# Patient Record
Sex: MALE | Race: WHITE | Employment: FULL TIME | URBAN - METROPOLITAN AREA
[De-identification: names, ages, dates, MRNs, and addresses within clinical notes are randomized per-mention and may not be internally consistent; named-entity substitution may affect disease eponyms.]

---

## 2024-03-01 ENCOUNTER — PREP FOR PROCEDURE (OUTPATIENT)
Dept: PULMONOLOGY | Age: 74
End: 2024-03-01

## 2024-03-01 RX ORDER — AMLODIPINE BESYLATE 5 MG/1
5 TABLET ORAL DAILY
COMMUNITY

## 2024-03-01 NOTE — PROGRESS NOTES
Patient reached _X___ yes--spoke with daughter, Beckie, who speaks English.      Date __3/7/24_______  Time __1100_____  Arrival __0930  hosp-endo____    Nothing to eat or drink after midnight-follow your doctors prep instructions-this may include taking a second dose of your prep after midnight  Responsible adult 18 or older to stay on site while you are here-drive you home-stay with you after  Follow any instructions your doctors office has given you  Bring a complete list of all your medications and supplements including name,dose,how often taken the day of your procedure  If you normally take the following medications in the morning please do so the AM of your procedure with a small sip of water       Heart,blood pressure,seizure,thyroid or breathing medications-use your inhalers-bring any rescue inhalers with you DOS       DO NOT take blood pressure medications ending in \"kathya\" or \"pril\" the AM of procedure or evening prior  Dr Sood patients are not to take any medications the AM of surgery  Take half or your normal dose of any long acting insulins the night before your procedure-do not take any diabetic medications the AM of procedure  Follow your doctors instructions regarding stopping or taking  any blood thinners-if you do not have instructions-call them  Any questions call your doctor  Other ___take amlodipine am of procedure___________________________________________________________      VISITOR POLICY(subject to change)             The current policy is 2 visitors per patient.There are no children allowed.Mask at discretion of facility. Visiting hours are 8a-8p.Overnight visitors will be at the discretion of the nurse. All policies are subject to change.

## 2024-03-04 ENCOUNTER — HOSPITAL ENCOUNTER (OUTPATIENT)
Age: 74
Discharge: HOME | End: 2024-03-04
Attending: INTERNAL MEDICINE
Payer: MEDICARE

## 2024-03-04 DIAGNOSIS — C34.12 SQUAMOUS CELL CARCINOMA OF BRONCHUS IN LEFT UPPER LOBE (HCC): ICD-10-CM

## 2024-03-04 PROCEDURE — A9579 GAD-BASE MR CONTRAST NOS,1ML: HCPCS | Performed by: INTERNAL MEDICINE

## 2024-03-04 PROCEDURE — 70553 MRI BRAIN STEM W/O & W/DYE: CPT

## 2024-03-04 PROCEDURE — 6360000004 HC RX CONTRAST MEDICATION: Performed by: INTERNAL MEDICINE

## 2024-03-04 RX ADMIN — GADOTERIDOL 16 ML: 279.3 INJECTION, SOLUTION INTRAVENOUS at 08:12

## 2024-03-06 ENCOUNTER — ANESTHESIA EVENT (OUTPATIENT)
Dept: ENDOSCOPY | Age: 74
End: 2024-03-06
Payer: COMMERCIAL

## 2024-03-07 ENCOUNTER — APPOINTMENT (OUTPATIENT)
Dept: GENERAL RADIOLOGY | Age: 74
End: 2024-03-07
Attending: INTERNAL MEDICINE
Payer: COMMERCIAL

## 2024-03-07 ENCOUNTER — HOSPITAL ENCOUNTER (OUTPATIENT)
Dept: CT IMAGING | Age: 74
Discharge: HOME | End: 2024-03-07
Attending: INTERNAL MEDICINE
Payer: COMMERCIAL

## 2024-03-07 ENCOUNTER — ANESTHESIA (OUTPATIENT)
Dept: ENDOSCOPY | Age: 74
End: 2024-03-07
Payer: COMMERCIAL

## 2024-03-07 ENCOUNTER — HOSPITAL ENCOUNTER (OUTPATIENT)
Age: 74
Setting detail: OUTPATIENT SURGERY
Discharge: HOME | End: 2024-03-07
Attending: INTERNAL MEDICINE | Admitting: INTERNAL MEDICINE
Payer: COMMERCIAL

## 2024-03-07 VITALS
RESPIRATION RATE: 16 BRPM | SYSTOLIC BLOOD PRESSURE: 123 MMHG | TEMPERATURE: 97.8 F | BODY MASS INDEX: 26.52 KG/M2 | HEART RATE: 81 BPM | OXYGEN SATURATION: 93 % | DIASTOLIC BLOOD PRESSURE: 68 MMHG | HEIGHT: 68 IN | WEIGHT: 175 LBS

## 2024-03-07 DIAGNOSIS — R91.1 LUNG NODULE: ICD-10-CM

## 2024-03-07 LAB
APTT BLD: 30.5 SEC (ref 22.7–35.9)
DEPRECATED RDW RBC AUTO: 13.7 % (ref 12.4–15.4)
HCT VFR BLD AUTO: 42.7 % (ref 40.5–52.5)
HGB BLD-MCNC: 14.3 G/DL (ref 13.5–17.5)
INR PPP: 1.08 (ref 0.84–1.16)
MCH RBC QN AUTO: 29.9 PG (ref 26–34)
MCHC RBC AUTO-ENTMCNC: 33.6 G/DL (ref 31–36)
MCV RBC AUTO: 89.1 FL (ref 80–100)
PLATELET # BLD AUTO: 283 K/UL (ref 135–450)
PMV BLD AUTO: 7.5 FL (ref 5–10.5)
PROTHROMBIN TIME: 14 SEC (ref 11.5–14.8)
RBC # BLD AUTO: 4.79 M/UL (ref 4.2–5.9)
WBC # BLD AUTO: 8.5 K/UL (ref 4–11)

## 2024-03-07 PROCEDURE — 3700000000 HC ANESTHESIA ATTENDED CARE: Performed by: INTERNAL MEDICINE

## 2024-03-07 PROCEDURE — 88112 CYTOPATH CELL ENHANCE TECH: CPT

## 2024-03-07 PROCEDURE — 88333 PATH CONSLTJ SURG CYTO XM 1: CPT

## 2024-03-07 PROCEDURE — 71045 X-RAY EXAM CHEST 1 VIEW: CPT

## 2024-03-07 PROCEDURE — 2720000010 HC SURG SUPPLY STERILE: Performed by: INTERNAL MEDICINE

## 2024-03-07 PROCEDURE — 88104 CYTOPATH FL NONGYN SMEARS: CPT

## 2024-03-07 PROCEDURE — 3609020000 HC BRONCHOSCOPY W/EBUS FNA: Performed by: INTERNAL MEDICINE

## 2024-03-07 PROCEDURE — 88341 IMHCHEM/IMCYTCHM EA ADD ANTB: CPT

## 2024-03-07 PROCEDURE — 7100000000 HC PACU RECOVERY - FIRST 15 MIN: Performed by: INTERNAL MEDICINE

## 2024-03-07 PROCEDURE — 31629 BRONCHOSCOPY/NEEDLE BX EACH: CPT | Performed by: INTERNAL MEDICINE

## 2024-03-07 PROCEDURE — 31623 DX BRONCHOSCOPE/BRUSH: CPT | Performed by: INTERNAL MEDICINE

## 2024-03-07 PROCEDURE — 6360000002 HC RX W HCPCS: Performed by: NURSE ANESTHETIST, CERTIFIED REGISTERED

## 2024-03-07 PROCEDURE — 71250 CT THORAX DX C-: CPT

## 2024-03-07 PROCEDURE — 3700000001 HC ADD 15 MINUTES (ANESTHESIA): Performed by: INTERNAL MEDICINE

## 2024-03-07 PROCEDURE — 36415 COLL VENOUS BLD VENIPUNCTURE: CPT

## 2024-03-07 PROCEDURE — 2580000003 HC RX 258: Performed by: ANESTHESIOLOGY

## 2024-03-07 PROCEDURE — 88305 TISSUE EXAM BY PATHOLOGIST: CPT

## 2024-03-07 PROCEDURE — 7100000011 HC PHASE II RECOVERY - ADDTL 15 MIN: Performed by: INTERNAL MEDICINE

## 2024-03-07 PROCEDURE — 2500000003 HC RX 250 WO HCPCS: Performed by: NURSE ANESTHETIST, CERTIFIED REGISTERED

## 2024-03-07 PROCEDURE — C1725 CATH, TRANSLUMIN NON-LASER: HCPCS | Performed by: INTERNAL MEDICINE

## 2024-03-07 PROCEDURE — 3603165200 HC BRNCHSC EBUS GUIDED SAMPL 1/2 NODE STATION/STRUX: Performed by: INTERNAL MEDICINE

## 2024-03-07 PROCEDURE — 31627 NAVIGATIONAL BRONCHOSCOPY: CPT | Performed by: INTERNAL MEDICINE

## 2024-03-07 PROCEDURE — 88172 CYTP DX EVAL FNA 1ST EA SITE: CPT

## 2024-03-07 PROCEDURE — 88360 TUMOR IMMUNOHISTOCHEM/MANUAL: CPT

## 2024-03-07 PROCEDURE — 88173 CYTOPATH EVAL FNA REPORT: CPT

## 2024-03-07 PROCEDURE — 88177 CYTP FNA EVAL EA ADDL: CPT

## 2024-03-07 PROCEDURE — 85610 PROTHROMBIN TIME: CPT

## 2024-03-07 PROCEDURE — 3609011800 HC BRONCHOSCOPY/TRANSBRONCHIAL LUNG BIOPSY: Performed by: INTERNAL MEDICINE

## 2024-03-07 PROCEDURE — 88342 IMHCHEM/IMCYTCHM 1ST ANTB: CPT

## 2024-03-07 PROCEDURE — 31652 BRONCH EBUS SAMPLNG 1/2 NODE: CPT | Performed by: INTERNAL MEDICINE

## 2024-03-07 PROCEDURE — 85027 COMPLETE CBC AUTOMATED: CPT

## 2024-03-07 PROCEDURE — 2709999900 HC NON-CHARGEABLE SUPPLY: Performed by: INTERNAL MEDICINE

## 2024-03-07 PROCEDURE — 85730 THROMBOPLASTIN TIME PARTIAL: CPT

## 2024-03-07 PROCEDURE — 31624 DX BRONCHOSCOPE/LAVAGE: CPT | Performed by: INTERNAL MEDICINE

## 2024-03-07 PROCEDURE — 3609011900 HC BRONCHOSCOPY NEEDLE BX TRACHEA MAIN STEM&/BRON: Performed by: INTERNAL MEDICINE

## 2024-03-07 PROCEDURE — 88334 PATH CONSLTJ SURG CYTO XM EA: CPT

## 2024-03-07 PROCEDURE — 7100000010 HC PHASE II RECOVERY - FIRST 15 MIN: Performed by: INTERNAL MEDICINE

## 2024-03-07 PROCEDURE — 7100000001 HC PACU RECOVERY - ADDTL 15 MIN: Performed by: INTERNAL MEDICINE

## 2024-03-07 PROCEDURE — 31628 BRONCHOSCOPY/LUNG BX EACH: CPT | Performed by: INTERNAL MEDICINE

## 2024-03-07 RX ORDER — SODIUM CHLORIDE 9 MG/ML
INJECTION, SOLUTION INTRAVENOUS CONTINUOUS
Status: DISCONTINUED | OUTPATIENT
Start: 2024-03-07 | End: 2024-03-07 | Stop reason: HOSPADM

## 2024-03-07 RX ORDER — LIDOCAINE HYDROCHLORIDE 20 MG/ML
INJECTION, SOLUTION EPIDURAL; INFILTRATION; INTRACAUDAL; PERINEURAL PRN
Status: DISCONTINUED | OUTPATIENT
Start: 2024-03-07 | End: 2024-03-07 | Stop reason: SDUPTHER

## 2024-03-07 RX ORDER — ONDANSETRON 2 MG/ML
INJECTION INTRAMUSCULAR; INTRAVENOUS PRN
Status: DISCONTINUED | OUTPATIENT
Start: 2024-03-07 | End: 2024-03-07 | Stop reason: SDUPTHER

## 2024-03-07 RX ORDER — ROCURONIUM BROMIDE 10 MG/ML
INJECTION, SOLUTION INTRAVENOUS PRN
Status: DISCONTINUED | OUTPATIENT
Start: 2024-03-07 | End: 2024-03-07 | Stop reason: SDUPTHER

## 2024-03-07 RX ORDER — PROPOFOL 10 MG/ML
INJECTION, EMULSION INTRAVENOUS PRN
Status: DISCONTINUED | OUTPATIENT
Start: 2024-03-07 | End: 2024-03-07 | Stop reason: SDUPTHER

## 2024-03-07 RX ORDER — FENTANYL CITRATE 50 UG/ML
INJECTION, SOLUTION INTRAMUSCULAR; INTRAVENOUS PRN
Status: DISCONTINUED | OUTPATIENT
Start: 2024-03-07 | End: 2024-03-07 | Stop reason: SDUPTHER

## 2024-03-07 RX ORDER — DEXAMETHASONE SODIUM PHOSPHATE 4 MG/ML
INJECTION, SOLUTION INTRA-ARTICULAR; INTRALESIONAL; INTRAMUSCULAR; INTRAVENOUS; SOFT TISSUE PRN
Status: DISCONTINUED | OUTPATIENT
Start: 2024-03-07 | End: 2024-03-07 | Stop reason: SDUPTHER

## 2024-03-07 RX ADMIN — PHENYLEPHRINE HYDROCHLORIDE 100 MCG: 10 INJECTION INTRAVENOUS at 11:41

## 2024-03-07 RX ADMIN — PROPOFOL 150 MG: 10 INJECTION, EMULSION INTRAVENOUS at 11:24

## 2024-03-07 RX ADMIN — ROCURONIUM BROMIDE 50 MG: 10 INJECTION, SOLUTION INTRAVENOUS at 11:24

## 2024-03-07 RX ADMIN — SUGAMMADEX 200 MG: 100 INJECTION, SOLUTION INTRAVENOUS at 12:59

## 2024-03-07 RX ADMIN — ONDANSETRON 4 MG: 2 INJECTION INTRAMUSCULAR; INTRAVENOUS at 11:28

## 2024-03-07 RX ADMIN — SODIUM CHLORIDE: 9 INJECTION, SOLUTION INTRAVENOUS at 09:34

## 2024-03-07 RX ADMIN — SODIUM CHLORIDE: 9 INJECTION, SOLUTION INTRAVENOUS at 12:32

## 2024-03-07 RX ADMIN — FENTANYL CITRATE 50 MCG: 50 INJECTION, SOLUTION INTRAMUSCULAR; INTRAVENOUS at 11:24

## 2024-03-07 RX ADMIN — DEXAMETHASONE SODIUM PHOSPHATE 8 MG: 4 INJECTION, SOLUTION INTRAMUSCULAR; INTRAVENOUS at 11:28

## 2024-03-07 RX ADMIN — LIDOCAINE HYDROCHLORIDE 100 MG: 20 INJECTION, SOLUTION EPIDURAL; INFILTRATION; INTRACAUDAL; PERINEURAL at 11:24

## 2024-03-07 ASSESSMENT — PAIN - FUNCTIONAL ASSESSMENT
PAIN_FUNCTIONAL_ASSESSMENT: NONE - DENIES PAIN
PAIN_FUNCTIONAL_ASSESSMENT: 0-10
PAIN_FUNCTIONAL_ASSESSMENT: NONE - DENIES PAIN

## 2024-03-07 ASSESSMENT — ENCOUNTER SYMPTOMS: SHORTNESS OF BREATH: 0

## 2024-03-07 NOTE — ANESTHESIA POSTPROCEDURE EVALUATION
Department of Anesthesiology  Postprocedure Note    Patient: Drew Carrasco  MRN: [id number]  YOB: 1950  Date of evaluation: 3/7/2024    Procedure Summary       Date: 03/07/24 Room / Location: [place] [address] / [facility]    Anesthesia Start: 1117 Anesthesia Stop: 1309    Procedures:       BRONCHOSCOPY/TRANSBRONCHIAL NEEDLE BIOPSY ROBOTIC      BRONCHOSCOPY ENDOBRONCHIAL ULTRASOUND      BRONCHOSCOPY/TRANSBRONCHIAL LUNG BIOPSY ROBOTIC      BRONCHOSCOPY/TRANSBRONCHIAL NEEDLE BIOPSY (Abdomen)      BRONCHOSCOPY ENDOBRONCHIAL ULTRASOUND FINE NEEDLE ASPIRATION Diagnosis:       Lung nodule      (Lung nodule [R91.1])    Surgeons: Uma Bautista MD Responsible Provider: Tex Robles MD    Anesthesia Type: MAC ASA Status: 2            Anesthesia Type: No value filed.    Hernan Phase I: Hernan Score: 10    Hernan Phase II:      Anesthesia Post Evaluation    Patient location during evaluation: PACU  Patient participation: complete - patient participated  Level of consciousness: awake  Airway patency: patent  Nausea & Vomiting: no nausea and no vomiting  Cardiovascular status: hemodynamically stable  Respiratory status: acceptable  Hydration status: stable  Multimodal analgesia pain management approach  Pain management: adequate    No notable events documented.

## 2024-03-07 NOTE — PROGRESS NOTES
IMPRESSION:  No evidence of pneumothorax in this patient status post [place] guided biopsy  of left lung mass as described above.

## 2024-03-07 NOTE — PROGRESS NOTES
Teaching/ education completed for home care including pain management, activity,safety precautionsand infection control. Patient and daughter verbalized understanding.

## 2024-03-07 NOTE — PROGRESS NOTES
To PACU via cart from endoscopy dept.  Report given to  myself from endoscopy dept, then given to Nelly PACU to complete hand-off procedure.

## 2024-03-07 NOTE — ANESTHESIA PRE PROCEDURE
Department of Anesthesiology  Preprocedure Note       Name:  Drew Carrasco   Age:  74 y.o.  :  1950                                          MRN:  [id number]         Date:  3/7/2024      Surgeon: Surgeon(s):  Uma Bautista MD    Procedure: Procedure(s):  BRONCHOSCOPY ROBOTIC  BRONCHOSCOPY ENDOBRONCHIAL ULTRASOUND    Medications prior to admission:   Prior to Admission medications    Medication Sig Start Date End Date Taking? Authorizing Provider   Omeprazole Magnesium (PRILOSEC PO) Take 20 mg by mouth once a week   Yes ProviderLolly MD   amLODIPine (NORVASC) 5 MG tablet Take 1 tablet by mouth daily   Yes Provider, MD Lolly       Current medications:    Current Facility-Administered Medications   Medication Dose Route Frequency Provider Last Rate Last Admin    0.9 % sodium chloride infusion   IntraVENous Continuous Tangela Ayala  mL/hr at 24 0934 New Bag at 24 0934       Allergies:  No Known Allergies    Problem List:  There is no problem list on file for this patient.      Past Medical History:        Diagnosis Date    Hypertension     Mass of upper lobe of lung     left lung       Past Surgical History:        Procedure Laterality Date    BOWEL RESECTION      due to severe ulcers       Social History:    Social History     Tobacco Use    Smoking status: Not on file    Smokeless tobacco: Not on file   Substance Use Topics    Alcohol use: Not on file                                Counseling given: Not Answered      Vital Signs (Current):   Vitals:    24 1535 24 0932   BP:  (!) 144/86   Pulse:  92   Resp:  20   Temp:  98.3 °F (36.8 °C)   TempSrc:  Temporal   SpO2:  93%   Weight: 79.4 kg (175 lb)    Height: 1.727 m (5' 8\")                                               BP Readings from Last 3 Encounters:   24 (!) 144/86       NPO Status: Time of last liquid consumption: 1630                        Time of last solid consumption: 1630

## 2024-03-07 NOTE — PROGRESS NOTES
Discharge instructions reviewed with patient/responsible adult. All home medications have been reviewed, questions answered and patient and daughter verbalized understanding.  Discharge instructions signed and copies given. Patient discharged per w/c with belongings.

## 2024-03-07 NOTE — H&P
PULMONARY AND CRITICAL CARE CONSULTATION NOTE    CONSULTING PHYSICIAN:      REASON FOR CONSULT: No chief complaint on file.      DATE OF CONSULT: 3/7/2024    HISTORY OF PRESENT ILLNESS: 74 y.o. year old male with history of hypertension and left upper lobe mass.    REVIEW OF SYSTEMS:   Not obtained    PAST MEDICAL HISTORY:   Past Medical History:   Diagnosis Date    Hypertension     Mass of upper lobe of lung     left lung       PAST SURGICAL HISTORY:   Past Surgical History:   Procedure Laterality Date    BOWEL RESECTION      due to severe ulcers       SOCIAL HISTORY:      FAMILY HISTORY: History reviewed. No pertinent family history.    MEDICATIONS:     No current facility-administered medications on file prior to encounter.     Current Outpatient Medications on File Prior to Encounter   Medication Sig Dispense Refill    Omeprazole Magnesium (PRILOSEC PO) Take 20 mg by mouth once a week      amLODIPine (NORVASC) 5 MG tablet Take 1 tablet by mouth daily            sodium chloride Stopped (03/07/24 1303)         ALLERGIES:   Allergies as of 03/01/2024    (No Known Allergies)      OBJECTIVE:   height is 1.727 m (5' 8\") and weight is 79.4 kg (175 lb). His temporal temperature is 97.4 °F (36.3 °C). His blood pressure is 110/65 and his pulse is 88. His respiration is 18 and oxygen saturation is 95%.   I/O this shift:  In: 1100 [I.V.:1100]  Out: -      PHYSICAL EXAM:  CONSTITUTIONAL: He is a 74 y.o.-year-old who appears his stated age. He is alert and oriented x 3 and in no acute distress.   HEENT: PERRLA No scleral icterus. No thrush, atraumatic, normocephalic.  NECK: Supple, without cervical or supraclavicular lymphadenopathy:  CARDIOVASCULAR: S1 S2 RRR. Without murmer  RESPIRATORY & CHEST: Lungs are clear to auscultation and percussion. No wheezing, no crackles. Good air movement  GASTROINTESTINAL & ABDOMEN: Soft, nontender, positive bowel sounds in all quadrants, non-distended, without hepatosplenomegaly.    GENITOURINARY: Deferred.   MUSCULOSKELETAL: No tenderness to palpation of the axial skeleton. There is no clubbing. No cyanosis. No edema of the lower extremities.   SKIN OF BODY: No rash or jaundice.   PSYCHIATRIC EVALUATION: Normal affect. Patient answers questions appropriately.   HEMATOLOGIC/LYMPHATIC/ IMMUNOLOGIC: No palpable lymphadenopathy.  NEUROLOGIC: Alert and oriented x 3.Groslly non-focal. Motor strength is 5+/5 in all muscle groups. The patient has a normal sensorium globally.      LABS:  Lab Results   Component Value Date    WBC 8.5 03/07/2024    HGB 14.3 03/07/2024    HCT 42.7 03/07/2024     03/07/2024    INR 1.08 03/07/2024       IMPRESSION:   Left upper lobe mass  Right hilar lymphadenopathy    RECOMMENDATION:   Patient will undergo robotic assisted transbronchial biopsy of left upper lobe mass and EBUS FNA of right hilar lymph node      Uma Bautista MD  Pulmonary Critical Care and Sleep Medicine  3/7/2024, 1:27 PM    This note was completed using dragon medical speech recognition software. Grammatical errors, random word insertions, pronoun errors and incomplete sentences are occasional consequences of this technology due to software limitations. If there are questions or concerns about the content of this note of information contained within the body of this dictation they should be addressed with the provider for clarification.

## 2024-03-07 NOTE — PROCEDURES
Bronchoscopy procedure note    Indications for procedure: Left upper lobe mass and right hilar lymphadenopathy  Preprocedure diagnosis: Same  Postprocedure diagnosis: Same  Anesthesia: General  ASA: 3  Mallampati Score: 3    Procedure:   1.  Robotic assisted bronchoscopy  2.  Transbronchial lung biopsy  3.  Transbronchial needle aspiration  4.  Transbronchial brushing  5.  EBUS FNA  6.  Bronchoalveolar lavage    Complications: None were apparent.  Blood loss: <10 mL    Description of procedure: After obtaining informed consent from the patient  was set up for this procedure as an inpatient.  After induction with general anesthesia patient was intubated with a 8.0 endotracheal tube.  Bronchoscope was inserted through this endotracheal tube.      EBUS bronchsocope was switched on and various lymph node stations were visualized. Significant lymphadenopathy was seen: Yes at stations right hilar. The right hilar lymph node stations were sampled with Trans bronchial needle aspiration using EBUS guidance. Mild oozing of blood was seen which was controlled with instillation of ice cold saline.    Bronchoscope was used to register the tracheobronchial landmarks with the Spool robotic bronchoscopy  Software. Software guidance in the electromagnetic field was then used to advance the robotic bronchoscope and sheath into the left upper lobe segment. Once the bronchoscope was positioned closed to the lesion, its location was confirmed using fluoroscopy.      Bronchoscope was advanced into the left upper lobe. Transbronchial biopsy, transbronchial brushing, transbronchial needle aspiration and bronchoalveolar lavage was done.  Fluoroscopic guidance was used to perform the above procedures.      Once adequate sample was collected, bronchoscope was used to perform a thorough pulmonary toilet.  Subsequent to this anesthesia was reversed and patient was sent to recovery.  A postprocedure chest x-ray was ordered which is currently

## 2024-03-07 NOTE — PROGRESS NOTES
Phase 2 - awake, nasal cannula @ 93% room air ,denies shortness of breath or chest pain ,family at bedside,vss.

## 2024-03-07 NOTE — DISCHARGE INSTRUCTIONS
ENDOSCOPY DISCHARGE INSTRUCTIONS    You may experience some lightheadedness for the next several hours.  Plan on quiet relaxation for the rest of today.  A responsible adult needs to stay with you today.  Because of the medications you received today-do not drive,operate machinery,or sign any contractual agreement for the next 24 hours.  Do not drink any alcoholic beverages or take any unprescribed medications tonight.  Eat bland food and avoid anything greasy or spicy initially-progress to your normal diet gradually.  Diet restrictions as instructed.  You may resume home medications as instructed.  If you have any of the following problems, notify your physician or return to the hospital emergency room : fever, chills, excessive bleeding, excessive vomiting, difficulty swallowing, uncontrolled pain, increased abdominal distention, shortness of breath or any other problems.  If you had a polyp removed, avoid strenuous activity for 48 hours.Avoid the use of aspirin or related compounds for one week, unless otherwise instructed by your physician.  You may notice a small amount of blood in your next few bowel movements, but if a large amount passes, call your physician.  If you have a sore throat, you may use lozenges or salt water gargles.  If you had a bronchoscopy and you experience sudden or continued shortness of breath, chest pain, spitting up or vomiting blood, notify your physician or return to the hospital emergency room.     ANESTHESIA DISCHARGE INSTRUCTIONS    Wear your seatbelt home.  You are under the influence of drugs-do not drink alcohol, drive, operate machinery, make any important decisions or sign any legal documents for 24 hours.  A responsible adult needs to be with you for 24 hours.  You may experience lightheadedness, dizziness, or sleepiness following surgery.  Rest at home today- increase activity as tolerated.  Progress slowly to a regular diet unless your physician has instructed you

## 2024-05-22 ENCOUNTER — HOSPITAL ENCOUNTER (OUTPATIENT)
Age: 74
Discharge: HOME | End: 2024-05-22

## 2024-05-22 DIAGNOSIS — I49.40 CARDIAC ARRHYTHMIA DUE TO PREMATURE DEPOLARIZATION, UNSPECIFIED TYPE: ICD-10-CM

## 2024-07-15 ENCOUNTER — HOSPITAL ENCOUNTER (OUTPATIENT)
Age: 74
Discharge: HOME | End: 2024-07-15
Attending: INTERNAL MEDICINE
Payer: COMMERCIAL

## 2024-07-15 DIAGNOSIS — C34.12 SMALL CELL LUNG CANCER, LEFT UPPER LOBE (HCC): ICD-10-CM

## 2024-07-15 LAB
EGFR, POC: >90 ML/MIN/1.73M2
POC CREATININE: 0.9 MG/DL (ref 0.8–1.3)

## 2024-07-15 PROCEDURE — 6360000004 HC RX CONTRAST MEDICATION: Performed by: INTERNAL MEDICINE

## 2024-07-15 PROCEDURE — 82565 ASSAY OF CREATININE: CPT

## 2024-07-15 PROCEDURE — 71260 CT THORAX DX C+: CPT

## 2024-07-15 RX ADMIN — IOPAMIDOL 75 ML: 755 INJECTION, SOLUTION INTRAVENOUS at 17:10

## 2024-08-26 ENCOUNTER — HOSPITAL ENCOUNTER (OUTPATIENT)
Age: 74
Discharge: HOME | End: 2024-08-26
Attending: INTERNAL MEDICINE
Payer: MEDICARE

## 2024-08-26 DIAGNOSIS — C34.12 PRIMARY MALIGNANT NEOPLASM OF BRONCHUS OF LEFT UPPER LOBE (HCC): ICD-10-CM

## 2024-08-26 PROCEDURE — 71260 CT THORAX DX C+: CPT

## 2024-08-26 PROCEDURE — 82565 ASSAY OF CREATININE: CPT

## 2024-08-26 PROCEDURE — 6360000004 HC RX CONTRAST MEDICATION: Performed by: INTERNAL MEDICINE

## 2024-08-26 RX ORDER — IOPAMIDOL 755 MG/ML
75 INJECTION, SOLUTION INTRAVASCULAR
Status: COMPLETED | OUTPATIENT
Start: 2024-08-26 | End: 2024-08-26

## 2024-08-26 RX ADMIN — IOPAMIDOL 75 ML: 755 INJECTION, SOLUTION INTRAVENOUS at 16:24

## 2024-08-27 LAB
EGFR, POC: >90 ML/MIN/1.73M2
POC CREATININE: 0.8 MG/DL (ref 0.8–1.3)

## 2024-08-29 ENCOUNTER — HOSPITAL ENCOUNTER (OUTPATIENT)
Age: 74
Discharge: HOME | End: 2024-08-29
Payer: MEDICARE

## 2024-08-29 DIAGNOSIS — C34.12 PRIMARY MALIGNANT NEOPLASM OF BRONCHUS OF LEFT UPPER LOBE (HCC): ICD-10-CM

## 2024-08-29 PROCEDURE — 74177 CT ABD & PELVIS W/CONTRAST: CPT

## 2024-08-29 PROCEDURE — 6360000004 HC RX CONTRAST MEDICATION

## 2024-08-29 RX ORDER — IOPAMIDOL 755 MG/ML
75 INJECTION, SOLUTION INTRAVASCULAR
Status: COMPLETED | OUTPATIENT
Start: 2024-08-29 | End: 2024-08-29

## 2024-08-29 RX ADMIN — IOPAMIDOL 75 ML: 755 INJECTION, SOLUTION INTRAVENOUS at 17:13

## 2024-08-29 RX ADMIN — DIATRIZOATE MEGLUMINE AND DIATRIZOATE SODIUM 30 ML: 660; 100 LIQUID ORAL; RECTAL at 17:13

## 2024-09-24 ENCOUNTER — APPOINTMENT (OUTPATIENT)
Age: 74
DRG: 193 | End: 2024-09-24
Payer: MEDICARE

## 2024-09-24 ENCOUNTER — HOSPITAL ENCOUNTER (INPATIENT)
Age: 74
LOS: 3 days | Discharge: HOME | DRG: 193 | End: 2024-09-27
Attending: EMERGENCY MEDICINE | Admitting: INTERNAL MEDICINE
Payer: MEDICARE

## 2024-09-24 DIAGNOSIS — J70.0 RADIATION PNEUMONITIS (HCC): ICD-10-CM

## 2024-09-24 DIAGNOSIS — J96.01 ACUTE RESPIRATORY FAILURE WITH HYPOXIA: Primary | ICD-10-CM

## 2024-09-24 DIAGNOSIS — C34.90 SMALL CELL CARCINOMA OF LUNG (HCC): ICD-10-CM

## 2024-09-24 DIAGNOSIS — N39.0 URINARY TRACT INFECTION WITHOUT HEMATURIA, SITE UNSPECIFIED: ICD-10-CM

## 2024-09-24 PROBLEM — J96.21 ACUTE ON CHRONIC RESPIRATORY FAILURE WITH HYPOXIA: Status: ACTIVE | Noted: 2024-09-24

## 2024-09-24 LAB
ADDITIONAL COMMENT:: NORMAL
ALBUMIN SERPL-MCNC: 3.5 G/DL (ref 3.4–5)
ALBUMIN/GLOB SERPL: 1.3 {RATIO}
ALP SERPL-CCNC: 71 U/L (ref 40–129)
ALT SERPL-CCNC: 72 U/L (ref 10–40)
ANION GAP SERPL CALCULATED.3IONS-SCNC: 13 MMOL/L (ref 3–16)
AST SERPL-CCNC: 63 U/L (ref 15–37)
BACTERIA URNS QL MICRO: ABNORMAL
BASOPHILS # BLD: 0 K/UL (ref 0–0.2)
BASOPHILS NFR BLD: 0 %
BILIRUB SERPL-MCNC: 0.9 MG/DL (ref 0–1)
BILIRUB UR QL STRIP: NEGATIVE
BNP SERPL-MCNC: 91 PG/ML (ref 0–124)
BUN SERPL-MCNC: 15 MG/DL (ref 7–20)
CALCIUM SERPL-MCNC: 8.6 MG/DL (ref 8.3–10.6)
CHARACTER UR: ABNORMAL
CHLORIDE SERPL-SCNC: 101 MMOL/L (ref 99–110)
CLARITY UR: CLEAR
CO2 SERPL-SCNC: 21 MMOL/L (ref 21–32)
COLOR UR: YELLOW
CREAT SERPL-MCNC: 1 MG/DL (ref 0.8–1.3)
EOSINOPHIL # BLD: 0.12 K/UL (ref 0–0.6)
EOSINOPHILS RELATIVE PERCENT: 10 %
ERYTHROCYTE [DISTWIDTH] IN BLOOD BY AUTOMATED COUNT: 14 % (ref 12.4–15.4)
FLUAV AG SPEC QL: NEGATIVE
FLUBV AG SPEC QL: NEGATIVE
GFR, ESTIMATED: 78 ML/MIN/1.73M2
GLUCOSE SERPL-MCNC: 129 MG/DL (ref 70–99)
GLUCOSE UR STRIP-MCNC: NEGATIVE MG/DL
HCT VFR BLD AUTO: 34.5 % (ref 40.5–52.5)
HGB BLD-MCNC: 11.7 G/DL (ref 13.5–17.5)
HGB UR QL STRIP.AUTO: ABNORMAL
INR PPP: 1 (ref 0.9–1.2)
KETONES UR STRIP-MCNC: NEGATIVE MG/DL
LACTATE BLDV-SCNC: 2.3 MMOL/L (ref 0.4–1.9)
LACTATE BLDV-SCNC: 2.7 MMOL/L (ref 0.4–1.9)
LEUKOCYTE ESTERASE UR QL STRIP: ABNORMAL
LYMPHOCYTES NFR BLD: 0.53 K/UL (ref 1–5.1)
LYMPHOCYTES RELATIVE PERCENT: 44 %
MAGNESIUM SERPL-MCNC: 2 MG/DL (ref 1.8–2.4)
MCH RBC QN AUTO: 30.1 PG (ref 26–34)
MCHC RBC AUTO-ENTMCNC: 33.9 G/DL (ref 31–36)
MCV RBC AUTO: 88.7 FL (ref 80–100)
MONOCYTES NFR BLD: 0.02 K/UL (ref 0–1.3)
MONOCYTES NFR BLD: 2 %
NEUTROPHILS NFR BLD: 44 %
NEUTS SEG NFR BLD: 0.53 K/UL (ref 1.7–7.7)
NITRITE UR QL STRIP: POSITIVE
PH UR STRIP: 6 [PH] (ref 5–8)
PHOSPHATE SERPL-MCNC: 2 MG/DL (ref 2.5–4.9)
PLATELET # BLD AUTO: 60 K/UL (ref 135–450)
PLATELET CONFIRMATION: NORMAL
PMV BLD AUTO: 9.7 FL
POTASSIUM SERPL-SCNC: 4 MMOL/L (ref 3.5–5.1)
PROCALCITONIN SERPL-MCNC: 0.67 NG/ML (ref 0–0.15)
PROT SERPL-MCNC: 6.3 G/DL (ref 6.4–8.2)
PROT UR STRIP-MCNC: NEGATIVE MG/DL
PROTHROMBIN TIME: 12.8 SEC (ref 11.9–14.9)
RBC # BLD AUTO: 3.89 M/UL (ref 4.2–5.9)
RBC # BLD: NORMAL 10*6/UL
RBC #/AREA URNS HPF: ABNORMAL /HPF
SARS-COV-2 RDRP RESP QL NAA+PROBE: NOT DETECTED
SODIUM SERPL-SCNC: 134 MMOL/L (ref 136–145)
SP GR UR STRIP: 1.01 (ref 1–1.03)
SPECIMEN DESCRIPTION: NORMAL
TROPONIN I SERPL HS-MCNC: 21 NG/L (ref 0–22)
TROPONIN I SERPL HS-MCNC: 23 NG/L (ref 0–22)
UROBILINOGEN UR STRIP-ACNC: 0.2 EU/DL (ref 0–1)
WBC # BLD: NORMAL 10*3/UL
WBC #/AREA URNS HPF: ABNORMAL /HPF
WBC OTHER # BLD: 1.2 K/UL (ref 4–11)

## 2024-09-24 PROCEDURE — 93005 ELECTROCARDIOGRAM TRACING: CPT

## 2024-09-24 PROCEDURE — 83605 ASSAY OF LACTIC ACID: CPT

## 2024-09-24 PROCEDURE — 6370000000 HC RX 637 (ALT 250 FOR IP)

## 2024-09-24 PROCEDURE — 2060000000 HC ICU INTERMEDIATE R&B

## 2024-09-24 PROCEDURE — 85610 PROTHROMBIN TIME: CPT

## 2024-09-24 PROCEDURE — 6360000002 HC RX W HCPCS

## 2024-09-24 PROCEDURE — 94761 N-INVAS EAR/PLS OXIMETRY MLT: CPT

## 2024-09-24 PROCEDURE — 2700000000 HC OXYGEN THERAPY PER DAY

## 2024-09-24 PROCEDURE — 96367 TX/PROPH/DG ADDL SEQ IV INF: CPT

## 2024-09-24 PROCEDURE — 6360000002 HC RX W HCPCS: Performed by: INTERNAL MEDICINE

## 2024-09-24 PROCEDURE — 84100 ASSAY OF PHOSPHORUS: CPT

## 2024-09-24 PROCEDURE — 84484 ASSAY OF TROPONIN QUANT: CPT

## 2024-09-24 PROCEDURE — 2580000003 HC RX 258: Performed by: INTERNAL MEDICINE

## 2024-09-24 PROCEDURE — 6360000002 HC RX W HCPCS: Performed by: EMERGENCY MEDICINE

## 2024-09-24 PROCEDURE — 87804 INFLUENZA ASSAY W/OPTIC: CPT

## 2024-09-24 PROCEDURE — 81001 URINALYSIS AUTO W/SCOPE: CPT

## 2024-09-24 PROCEDURE — 71260 CT THORAX DX C+: CPT

## 2024-09-24 PROCEDURE — 80053 COMPREHEN METABOLIC PANEL: CPT

## 2024-09-24 PROCEDURE — 85025 COMPLETE CBC W/AUTO DIFF WBC: CPT

## 2024-09-24 PROCEDURE — 2580000003 HC RX 258: Performed by: EMERGENCY MEDICINE

## 2024-09-24 PROCEDURE — 94640 AIRWAY INHALATION TREATMENT: CPT

## 2024-09-24 PROCEDURE — 87635 SARS-COV-2 COVID-19 AMP PRB: CPT

## 2024-09-24 PROCEDURE — 87086 URINE CULTURE/COLONY COUNT: CPT

## 2024-09-24 PROCEDURE — 96375 TX/PRO/DX INJ NEW DRUG ADDON: CPT

## 2024-09-24 PROCEDURE — 6370000000 HC RX 637 (ALT 250 FOR IP): Performed by: INTERNAL MEDICINE

## 2024-09-24 PROCEDURE — 83880 ASSAY OF NATRIURETIC PEPTIDE: CPT

## 2024-09-24 PROCEDURE — 71045 X-RAY EXAM CHEST 1 VIEW: CPT

## 2024-09-24 PROCEDURE — 84145 PROCALCITONIN (PCT): CPT

## 2024-09-24 PROCEDURE — 83735 ASSAY OF MAGNESIUM: CPT

## 2024-09-24 PROCEDURE — 87040 BLOOD CULTURE FOR BACTERIA: CPT

## 2024-09-24 PROCEDURE — 96365 THER/PROPH/DIAG IV INF INIT: CPT

## 2024-09-24 PROCEDURE — 6360000004 HC RX CONTRAST MEDICATION: Performed by: EMERGENCY MEDICINE

## 2024-09-24 PROCEDURE — 99291 CRITICAL CARE FIRST HOUR: CPT

## 2024-09-24 RX ORDER — ONDANSETRON 4 MG/1
8 TABLET, ORALLY DISINTEGRATING ORAL EVERY 8 HOURS PRN
Status: ON HOLD | COMMUNITY
End: 2024-09-25

## 2024-09-24 RX ORDER — LANOLIN ALCOHOL/MO/W.PET/CERES
6 CREAM (GRAM) TOPICAL NIGHTLY PRN
Status: DISCONTINUED | OUTPATIENT
Start: 2024-09-24 | End: 2024-09-27 | Stop reason: HOSPADM

## 2024-09-24 RX ORDER — SODIUM CHLORIDE 9 MG/ML
INJECTION, SOLUTION INTRAVENOUS PRN
Status: DISCONTINUED | OUTPATIENT
Start: 2024-09-24 | End: 2024-09-27 | Stop reason: HOSPADM

## 2024-09-24 RX ORDER — ACETAMINOPHEN 325 MG/1
650 TABLET ORAL EVERY 6 HOURS PRN
Status: DISCONTINUED | OUTPATIENT
Start: 2024-09-24 | End: 2024-09-27 | Stop reason: HOSPADM

## 2024-09-24 RX ORDER — 0.9 % SODIUM CHLORIDE 0.9 %
500 INTRAVENOUS SOLUTION INTRAVENOUS ONCE
Status: COMPLETED | OUTPATIENT
Start: 2024-09-24 | End: 2024-09-24

## 2024-09-24 RX ORDER — POTASSIUM CHLORIDE 7.45 MG/ML
10 INJECTION INTRAVENOUS PRN
Status: DISCONTINUED | OUTPATIENT
Start: 2024-09-24 | End: 2024-09-27 | Stop reason: HOSPADM

## 2024-09-24 RX ORDER — IPRATROPIUM BROMIDE AND ALBUTEROL SULFATE 2.5; .5 MG/3ML; MG/3ML
SOLUTION RESPIRATORY (INHALATION)
Status: COMPLETED
Start: 2024-09-24 | End: 2024-09-24

## 2024-09-24 RX ORDER — MAGNESIUM SULFATE IN WATER 40 MG/ML
2000 INJECTION, SOLUTION INTRAVENOUS PRN
Status: DISCONTINUED | OUTPATIENT
Start: 2024-09-24 | End: 2024-09-27 | Stop reason: HOSPADM

## 2024-09-24 RX ORDER — ONDANSETRON 2 MG/ML
4 INJECTION INTRAMUSCULAR; INTRAVENOUS EVERY 6 HOURS PRN
Status: DISCONTINUED | OUTPATIENT
Start: 2024-09-24 | End: 2024-09-27 | Stop reason: HOSPADM

## 2024-09-24 RX ORDER — POTASSIUM CHLORIDE 1500 MG/1
40 TABLET, EXTENDED RELEASE ORAL PRN
Status: DISCONTINUED | OUTPATIENT
Start: 2024-09-24 | End: 2024-09-27 | Stop reason: HOSPADM

## 2024-09-24 RX ORDER — LEVALBUTEROL 1.25 MG/.5ML
1.25 SOLUTION, CONCENTRATE RESPIRATORY (INHALATION)
Status: DISCONTINUED | OUTPATIENT
Start: 2024-09-24 | End: 2024-09-27 | Stop reason: HOSPADM

## 2024-09-24 RX ORDER — IOPAMIDOL 755 MG/ML
75 INJECTION, SOLUTION INTRAVASCULAR
Status: COMPLETED | OUTPATIENT
Start: 2024-09-24 | End: 2024-09-24

## 2024-09-24 RX ORDER — DEXAMETHASONE SODIUM PHOSPHATE 10 MG/ML
10 INJECTION, SOLUTION INTRAMUSCULAR; INTRAVENOUS ONCE
Status: COMPLETED | OUTPATIENT
Start: 2024-09-24 | End: 2024-09-24

## 2024-09-24 RX ORDER — PREDNISONE 10 MG/1
10 TABLET ORAL DAILY
COMMUNITY

## 2024-09-24 RX ORDER — SODIUM CHLORIDE 0.9 % (FLUSH) 0.9 %
10 SYRINGE (ML) INJECTION PRN
Status: DISCONTINUED | OUTPATIENT
Start: 2024-09-24 | End: 2024-09-27 | Stop reason: HOSPADM

## 2024-09-24 RX ORDER — ALBUTEROL SULFATE 0.63 MG/3ML
1 SOLUTION RESPIRATORY (INHALATION) EVERY 6 HOURS PRN
Status: ON HOLD | COMMUNITY
End: 2024-09-25

## 2024-09-24 RX ORDER — ENOXAPARIN SODIUM 100 MG/ML
40 INJECTION SUBCUTANEOUS DAILY
Status: DISCONTINUED | OUTPATIENT
Start: 2024-09-25 | End: 2024-09-25

## 2024-09-24 RX ORDER — ALBUTEROL SULFATE 0.83 MG/ML
SOLUTION RESPIRATORY (INHALATION)
Status: COMPLETED
Start: 2024-09-24 | End: 2024-09-24

## 2024-09-24 RX ORDER — 0.9 % SODIUM CHLORIDE 0.9 %
1000 INTRAVENOUS SOLUTION INTRAVENOUS ONCE
Status: COMPLETED | OUTPATIENT
Start: 2024-09-24 | End: 2024-09-24

## 2024-09-24 RX ORDER — PREDNISONE 20 MG/1
20 TABLET ORAL DAILY
Status: DISCONTINUED | OUTPATIENT
Start: 2024-09-25 | End: 2024-09-25

## 2024-09-24 RX ORDER — ACETAMINOPHEN 650 MG/1
650 SUPPOSITORY RECTAL EVERY 6 HOURS PRN
Status: DISCONTINUED | OUTPATIENT
Start: 2024-09-24 | End: 2024-09-27 | Stop reason: HOSPADM

## 2024-09-24 RX ORDER — SENNOSIDES A AND B 8.6 MG/1
1 TABLET, FILM COATED ORAL DAILY PRN
Status: DISCONTINUED | OUTPATIENT
Start: 2024-09-24 | End: 2024-09-27 | Stop reason: HOSPADM

## 2024-09-24 RX ORDER — GUAIFENESIN 600 MG/1
600 TABLET, EXTENDED RELEASE ORAL 2 TIMES DAILY
Status: DISCONTINUED | OUTPATIENT
Start: 2024-09-24 | End: 2024-09-27 | Stop reason: HOSPADM

## 2024-09-24 RX ORDER — 0.9 % SODIUM CHLORIDE 0.9 %
30 INTRAVENOUS SOLUTION INTRAVENOUS ONCE
Status: DISCONTINUED | OUTPATIENT
Start: 2024-09-24 | End: 2024-09-24

## 2024-09-24 RX ADMIN — SODIUM CHLORIDE 1000 ML: 9 INJECTION, SOLUTION INTRAVENOUS at 20:32

## 2024-09-24 RX ADMIN — VANCOMYCIN HYDROCHLORIDE 1000 MG: 1 INJECTION, POWDER, LYOPHILIZED, FOR SOLUTION INTRAVENOUS at 20:28

## 2024-09-24 RX ADMIN — DEXAMETHASONE SODIUM PHOSPHATE 10 MG: 10 INJECTION, SOLUTION INTRAMUSCULAR; INTRAVENOUS at 20:22

## 2024-09-24 RX ADMIN — VANCOMYCIN HYDROCHLORIDE 750 MG: 750 INJECTION, POWDER, LYOPHILIZED, FOR SOLUTION INTRAVENOUS at 23:41

## 2024-09-24 RX ADMIN — SODIUM CHLORIDE 500 ML: 9 INJECTION, SOLUTION INTRAVENOUS at 19:47

## 2024-09-24 RX ADMIN — ALBUTEROL SULFATE: 2.5 SOLUTION RESPIRATORY (INHALATION) at 19:15

## 2024-09-24 RX ADMIN — GUAIFENESIN 600 MG: 600 TABLET, EXTENDED RELEASE ORAL at 23:40

## 2024-09-24 RX ADMIN — ALBUTEROL SULFATE 2.5 MG: 2.5 SOLUTION RESPIRATORY (INHALATION) at 19:15

## 2024-09-24 RX ADMIN — LEVALBUTEROL 1.25 MG: 1.25 SOLUTION, CONCENTRATE RESPIRATORY (INHALATION) at 22:58

## 2024-09-24 RX ADMIN — CEFEPIME 2000 MG: 2 INJECTION, POWDER, FOR SOLUTION INTRAVENOUS at 19:41

## 2024-09-24 RX ADMIN — IPRATROPIUM BROMIDE AND ALBUTEROL SULFATE 3 ML: 2.5; .5 SOLUTION RESPIRATORY (INHALATION) at 19:15

## 2024-09-24 RX ADMIN — IOPAMIDOL 75 ML: 755 INJECTION, SOLUTION INTRAVENOUS at 20:39

## 2024-09-24 ASSESSMENT — PAIN - FUNCTIONAL ASSESSMENT
PAIN_FUNCTIONAL_ASSESSMENT: NONE - DENIES PAIN
PAIN_FUNCTIONAL_ASSESSMENT: NONE - DENIES PAIN

## 2024-09-24 ASSESSMENT — LIFESTYLE VARIABLES
HOW MANY STANDARD DRINKS CONTAINING ALCOHOL DO YOU HAVE ON A TYPICAL DAY: PATIENT DOES NOT DRINK
HOW OFTEN DO YOU HAVE A DRINK CONTAINING ALCOHOL: NEVER

## 2024-09-24 ASSESSMENT — ENCOUNTER SYMPTOMS
COUGH: 1
SHORTNESS OF BREATH: 1
GASTROINTESTINAL NEGATIVE: 1

## 2024-09-24 NOTE — CONSULTS
Pharmacy to dose IV Vanco in ED x1 dose:  PNA/ Sepsis - Please give a total of 1,750mg now to provide Gram+ organism coverage to include mRSA.    Wilber Gonzalez RPH PharmD 9/24/2024 7:41 PM

## 2024-09-24 NOTE — ED TRIAGE NOTES
Pt reports to the ED with shortness of breath, cough, and fever following a chemo treatment on Tuesday 09/17/2024. Pt was 72% on room air upon arrival. Pt placed on 5L O2 via nasal cannula and is at 98%. Pt reports mild chest pain.

## 2024-09-25 PROBLEM — C34.90 SMALL CELL CARCINOMA OF LUNG (HCC): Status: ACTIVE | Noted: 2024-09-25

## 2024-09-25 PROBLEM — R93.89 ABNORMAL CT OF THE CHEST: Status: ACTIVE | Noted: 2024-09-25

## 2024-09-25 PROBLEM — Z79.52 ON PREDNISONE THERAPY: Status: ACTIVE | Noted: 2024-09-25

## 2024-09-25 PROBLEM — J70.0 RADIATION PNEUMONITIS (HCC): Status: ACTIVE | Noted: 2024-09-25

## 2024-09-25 PROBLEM — R79.89 LFT ELEVATION: Status: ACTIVE | Noted: 2024-09-25

## 2024-09-25 PROBLEM — D70.9 NEUTROPENIC FEVER (HCC): Status: ACTIVE | Noted: 2024-09-25

## 2024-09-25 PROBLEM — Z78.9 ON SUPPLEMENTAL OXYGEN BY NASAL CANNULA: Status: ACTIVE | Noted: 2024-09-25

## 2024-09-25 PROBLEM — J96.01 ACUTE RESPIRATORY FAILURE WITH HYPOXIA: Status: ACTIVE | Noted: 2024-09-25

## 2024-09-25 PROBLEM — R50.81 NEUTROPENIC FEVER (HCC): Status: ACTIVE | Noted: 2024-09-25

## 2024-09-25 PROBLEM — N39.0 URINARY TRACT INFECTION WITHOUT HEMATURIA: Status: ACTIVE | Noted: 2024-09-25

## 2024-09-25 PROBLEM — E87.20 LACTIC ACID ACIDOSIS: Status: ACTIVE | Noted: 2024-09-25

## 2024-09-25 LAB
ADDITIONAL COMMENT:: NORMAL
ALBUMIN SERPL-MCNC: 3.4 G/DL (ref 3.4–5)
ALBUMIN/GLOB SERPL: 1.3 {RATIO}
ALP SERPL-CCNC: 69 U/L (ref 40–129)
ALT SERPL-CCNC: 80 U/L (ref 10–40)
ANION GAP SERPL CALCULATED.3IONS-SCNC: 10 MMOL/L (ref 3–16)
AST SERPL-CCNC: 64 U/L (ref 15–37)
ATYPICAL LYMPHOCYTE ABSOLUTE COUNT: 0.04 K/UL
ATYPICAL LYMPHOCYTES: 8 % (ref 0–6)
B PARAP IS1001 DNA NPH QL NAA+NON-PROBE: NOT DETECTED
B PERT DNA SPEC QL NAA+PROBE: NOT DETECTED
BASOPHILS # BLD: 0.01 K/UL (ref 0–0.2)
BASOPHILS NFR BLD: 2 %
BILIRUB DIRECT SERPL-MCNC: <0.2 MG/DL (ref 0–0.3)
BILIRUB INDIRECT SERPL-MCNC: ABNORMAL MG/DL (ref 0–1)
BILIRUB SERPL-MCNC: 0.3 MG/DL (ref 0–1)
BUN SERPL-MCNC: 13 MG/DL (ref 7–20)
C PNEUM DNA NPH QL NAA+NON-PROBE: NOT DETECTED
CALCIUM SERPL-MCNC: 8.4 MG/DL (ref 8.3–10.6)
CHLORIDE SERPL-SCNC: 107 MMOL/L (ref 99–110)
CO2 SERPL-SCNC: 20 MMOL/L (ref 21–32)
CREAT SERPL-MCNC: 0.9 MG/DL (ref 0.8–1.3)
DATE LAST DOSE: NORMAL
EOSINOPHIL # BLD: 0.01 K/UL (ref 0–0.6)
EOSINOPHILS RELATIVE PERCENT: 2 %
ERYTHROCYTE [DISTWIDTH] IN BLOOD BY AUTOMATED COUNT: 13.9 % (ref 12.4–15.4)
EST. AVERAGE GLUCOSE BLD GHB EST-MCNC: 186 MG/DL
FLUAV RNA NPH QL NAA+NON-PROBE: NOT DETECTED
FLUBV RNA NPH QL NAA+NON-PROBE: NOT DETECTED
GFR, ESTIMATED: 90 ML/MIN/1.73M2
GLUCOSE SERPL-MCNC: 270 MG/DL (ref 70–99)
HADV DNA NPH QL NAA+NON-PROBE: NOT DETECTED
HBA1C MFR BLD: 8.1 %
HCOV 229E RNA NPH QL NAA+NON-PROBE: NOT DETECTED
HCOV HKU1 RNA NPH QL NAA+NON-PROBE: NOT DETECTED
HCOV NL63 RNA NPH QL NAA+NON-PROBE: NOT DETECTED
HCOV OC43 RNA NPH QL NAA+NON-PROBE: NOT DETECTED
HCT VFR BLD AUTO: 32.7 % (ref 40.5–52.5)
HGB BLD-MCNC: 11.2 G/DL (ref 13.5–17.5)
HMPV RNA NPH QL NAA+NON-PROBE: NOT DETECTED
HPIV1 RNA NPH QL NAA+NON-PROBE: NOT DETECTED
HPIV2 RNA NPH QL NAA+NON-PROBE: NOT DETECTED
HPIV3 RNA NPH QL NAA+NON-PROBE: NOT DETECTED
HPIV4 RNA NPH QL NAA+NON-PROBE: NOT DETECTED
INR PPP: 1 (ref 0.9–1.2)
LDH SERPL-CCNC: 231 U/L (ref 100–190)
LYMPHOCYTES NFR BLD: 0.13 K/UL (ref 1–5.1)
LYMPHOCYTES RELATIVE PERCENT: 26 %
M PNEUMO DNA NPH QL NAA+NON-PROBE: NOT DETECTED
MCH RBC QN AUTO: 30.6 PG (ref 26–34)
MCHC RBC AUTO-ENTMCNC: 34.3 G/DL (ref 31–36)
MCV RBC AUTO: 89.3 FL (ref 80–100)
MONOCYTES NFR BLD: 0.01 K/UL (ref 0–1.3)
MONOCYTES NFR BLD: 2 %
MRSA, DNA, NASAL: NOT DETECTED
NEUTROPHILS NFR BLD: 60 %
NEUTS SEG NFR BLD: 0.3 K/UL (ref 1.7–7.7)
PATH REV BLD -IMP: NORMAL
PLATELET # BLD AUTO: 38 K/UL (ref 135–450)
PLATELET CONFIRMATION: NORMAL
PLATELET ESTIMATE: NORMAL
PMV BLD AUTO: 10.3 FL
POTASSIUM SERPL-SCNC: 4.6 MMOL/L (ref 3.5–5.1)
PROCALCITONIN SERPL-MCNC: 0.54 NG/ML (ref 0–0.15)
PROT SERPL-MCNC: 6 G/DL (ref 6.4–8.2)
PROTHROMBIN TIME: 13.4 SEC (ref 11.9–14.9)
RBC # BLD AUTO: 3.66 M/UL (ref 4.2–5.9)
RBC # BLD: ABNORMAL 10*6/UL
RSV RNA NPH QL NAA+NON-PROBE: NOT DETECTED
RV+EV RNA NPH QL NAA+NON-PROBE: NOT DETECTED
SARS-COV-2 RNA NPH QL NAA+NON-PROBE: NOT DETECTED
SODIUM SERPL-SCNC: 137 MMOL/L (ref 136–145)
SPECIMEN DESCRIPTION: NORMAL
SPECIMEN DESCRIPTION: NORMAL
TME LAST DOSE: NORMAL H
VANCOMYCIN DOSE: NORMAL MG
VANCOMYCIN SERPL-MCNC: 9.2 UG/ML
WBC # BLD: NORMAL 10*3/UL
WBC OTHER # BLD: 0.5 K/UL (ref 4–11)

## 2024-09-25 PROCEDURE — 0202U NFCT DS 22 TRGT SARS-COV-2: CPT

## 2024-09-25 PROCEDURE — 83615 LACTATE (LD) (LDH) ENZYME: CPT

## 2024-09-25 PROCEDURE — 85027 COMPLETE CBC AUTOMATED: CPT

## 2024-09-25 PROCEDURE — 99223 1ST HOSP IP/OBS HIGH 75: CPT | Performed by: INTERNAL MEDICINE

## 2024-09-25 PROCEDURE — 6360000002 HC RX W HCPCS: Performed by: INTERNAL MEDICINE

## 2024-09-25 PROCEDURE — 87070 CULTURE OTHR SPECIMN AEROBIC: CPT

## 2024-09-25 PROCEDURE — 83036 HEMOGLOBIN GLYCOSYLATED A1C: CPT

## 2024-09-25 PROCEDURE — 80202 ASSAY OF VANCOMYCIN: CPT

## 2024-09-25 PROCEDURE — 87798 DETECT AGENT NOS DNA AMP: CPT

## 2024-09-25 PROCEDURE — 87633 RESP VIRUS 12-25 TARGETS: CPT

## 2024-09-25 PROCEDURE — 2060000000 HC ICU INTERMEDIATE R&B

## 2024-09-25 PROCEDURE — 87899 AGENT NOS ASSAY W/OPTIC: CPT

## 2024-09-25 PROCEDURE — 2700000000 HC OXYGEN THERAPY PER DAY

## 2024-09-25 PROCEDURE — 6370000000 HC RX 637 (ALT 250 FOR IP): Performed by: INTERNAL MEDICINE

## 2024-09-25 PROCEDURE — 36415 COLL VENOUS BLD VENIPUNCTURE: CPT

## 2024-09-25 PROCEDURE — 94761 N-INVAS EAR/PLS OXIMETRY MLT: CPT

## 2024-09-25 PROCEDURE — 85610 PROTHROMBIN TIME: CPT

## 2024-09-25 PROCEDURE — 6370000000 HC RX 637 (ALT 250 FOR IP): Performed by: NURSE PRACTITIONER

## 2024-09-25 PROCEDURE — 85025 COMPLETE CBC W/AUTO DIFF WBC: CPT

## 2024-09-25 PROCEDURE — 87449 NOS EACH ORGANISM AG IA: CPT

## 2024-09-25 PROCEDURE — 80053 COMPREHEN METABOLIC PANEL: CPT

## 2024-09-25 PROCEDURE — 84145 PROCALCITONIN (PCT): CPT

## 2024-09-25 PROCEDURE — 99223 1ST HOSP IP/OBS HIGH 75: CPT | Performed by: NURSE PRACTITIONER

## 2024-09-25 PROCEDURE — 87641 MR-STAPH DNA AMP PROBE: CPT

## 2024-09-25 PROCEDURE — 82248 BILIRUBIN DIRECT: CPT

## 2024-09-25 PROCEDURE — 87205 SMEAR GRAM STAIN: CPT

## 2024-09-25 PROCEDURE — 2580000003 HC RX 258: Performed by: INTERNAL MEDICINE

## 2024-09-25 PROCEDURE — 94640 AIRWAY INHALATION TREATMENT: CPT

## 2024-09-25 RX ORDER — ACETAMINOPHEN 500 MG
1000 TABLET ORAL DAILY PRN
COMMUNITY

## 2024-09-25 RX ORDER — OMEPRAZOLE 40 MG/1
40 CAPSULE, DELAYED RELEASE ORAL DAILY
COMMUNITY

## 2024-09-25 RX ORDER — PROMETHAZINE HYDROCHLORIDE 25 MG/1
25 TABLET ORAL EVERY 6 HOURS PRN
COMMUNITY

## 2024-09-25 RX ORDER — LEVOFLOXACIN 5 MG/ML
750 INJECTION, SOLUTION INTRAVENOUS EVERY 24 HOURS
Status: DISCONTINUED | OUTPATIENT
Start: 2024-09-25 | End: 2024-09-27 | Stop reason: HOSPADM

## 2024-09-25 RX ORDER — ALBUTEROL SULFATE 90 UG/1
1 INHALANT RESPIRATORY (INHALATION)
COMMUNITY

## 2024-09-25 RX ORDER — TAMSULOSIN HYDROCHLORIDE 0.4 MG/1
0.4 CAPSULE ORAL DAILY
COMMUNITY

## 2024-09-25 RX ORDER — PREDNISONE 10 MG/1
TABLET ORAL
Status: ON HOLD | COMMUNITY
End: 2024-09-25 | Stop reason: SDUPTHER

## 2024-09-25 RX ORDER — OMEPRAZOLE 40 MG/1
40 CAPSULE, DELAYED RELEASE ORAL DAILY
Status: ON HOLD | COMMUNITY
End: 2024-09-25

## 2024-09-25 RX ORDER — SUCRALFATE 1 G/1
TABLET ORAL
Status: ON HOLD | COMMUNITY
End: 2024-09-25

## 2024-09-25 RX ORDER — PREDNISONE 10 MG/1
10 TABLET ORAL DAILY
Status: DISCONTINUED | OUTPATIENT
Start: 2024-09-25 | End: 2024-09-27 | Stop reason: HOSPADM

## 2024-09-25 RX ORDER — LORATADINE 10 MG/1
10 TABLET ORAL DAILY PRN
COMMUNITY

## 2024-09-25 RX ADMIN — LEVALBUTEROL 1.25 MG: 1.25 SOLUTION, CONCENTRATE RESPIRATORY (INHALATION) at 21:23

## 2024-09-25 RX ADMIN — LEVALBUTEROL 1.25 MG: 1.25 SOLUTION, CONCENTRATE RESPIRATORY (INHALATION) at 08:30

## 2024-09-25 RX ADMIN — Medication 10 ML: at 11:14

## 2024-09-25 RX ADMIN — PREDNISONE 10 MG: 10 TABLET ORAL at 14:50

## 2024-09-25 RX ADMIN — PIPERACILLIN AND TAZOBACTAM 3375 MG: 3; .375 INJECTION, POWDER, LYOPHILIZED, FOR SOLUTION INTRAVENOUS at 06:28

## 2024-09-25 RX ADMIN — PIPERACILLIN AND TAZOBACTAM 3375 MG: 3; .375 INJECTION, POWDER, LYOPHILIZED, FOR SOLUTION INTRAVENOUS at 14:50

## 2024-09-25 RX ADMIN — PIPERACILLIN AND TAZOBACTAM 3375 MG: 3; .375 INJECTION, POWDER, LYOPHILIZED, FOR SOLUTION INTRAVENOUS at 22:26

## 2024-09-25 RX ADMIN — LEVOFLOXACIN 750 MG: 5 INJECTION, SOLUTION INTRAVENOUS at 11:18

## 2024-09-25 RX ADMIN — GUAIFENESIN 600 MG: 600 TABLET, EXTENDED RELEASE ORAL at 21:00

## 2024-09-25 RX ADMIN — GUAIFENESIN 600 MG: 600 TABLET, EXTENDED RELEASE ORAL at 11:14

## 2024-09-25 RX ADMIN — LEVALBUTEROL 1.25 MG: 1.25 SOLUTION, CONCENTRATE RESPIRATORY (INHALATION) at 13:52

## 2024-09-25 ASSESSMENT — ENCOUNTER SYMPTOMS
EYES NEGATIVE: 1
ABDOMINAL PAIN: 0
SHORTNESS OF BREATH: 1
GASTROINTESTINAL NEGATIVE: 1
ALLERGIC/IMMUNOLOGIC NEGATIVE: 1
STRIDOR: 0
CONSTIPATION: 1
COUGH: 1
WHEEZING: 0

## 2024-09-25 NOTE — PROGRESS NOTES
the last 72 hours.    Urinalysis:      Lab Results   Component Value Date/Time    NITRU POSITIVE 09/24/2024 07:11 PM    WBCUA 6 TO 9 09/24/2024 07:11 PM    BACTERIA 3+ 09/24/2024 07:11 PM    RBCUA 0 TO 2 09/24/2024 07:11 PM    GLUCOSEU NEGATIVE 09/24/2024 07:11 PM       Radiology:  CT CHEST PULMONARY EMBOLISM W CONTRAST   Final Result      1.  No evidence of pulmonary embolism.   2.  New very small bilateral pleural effusions.   3.  Stable bilateral perihilar post radiation changes with the exception of new   mild groundglass opacities posteriorly in the left lower lobe which could   represent superimposed pneumonia versus progressed radiation fibrosis compared   to 8/26/2024.         Electronically signed by Tex Issa      XR CHEST PORTABLE   Final Result      Stable treatment-related changes. No acute consolidation.         Electronically signed by Tex Singletary, APRN - CNP      NOTE:  This report was transcribed using voice recognition software.  Every effort was made to ensure accuracy; however, inadvertent computerized transcription errors may be present.

## 2024-09-25 NOTE — CARE COORDINATION
Met with pt, spouse and daughter at bedside. Explained cm role. Pt from home with spouse. Pt IPTA. Family can transport at d/c. Pt has recently started O2 services with [facility]. Called Lokesh at [facility] to convey pt was admitted to [facility] and requested O2 tank delivered to pt room.  Review of chart for any potential discharge needs.   MD and bedside RN, if needs arise please consult case management for discharge intervention.  CM will follow as needed      .

## 2024-09-25 NOTE — PROGRESS NOTES
Add 16921 Cpt? (Important Note: In 2017 The Use Of 21493 Is Being Tracked By Cms To Determine Future Global Period Reimbursement For Global Periods): yes Pt ambulated to BR and in room with 2 L NC on. Pt very air hunry with exertion. O2 sats decreased to 80% on 2 L NC and HR increased to 120. Pt assisted back to bed; o2 sats increased to 92% on 2 L nc. Will cont to monitor.    Detail Level: Detailed

## 2024-09-25 NOTE — ED PROVIDER NOTES
Rashaun Leon DO  Physician  Specialty: Emergency Medicine     ED Triage Notes     Signed     Date of Service: 9/24/2024  6:38 PM     Signed       Expand All Collapse All    [facility] 3 PROGRESSIVE CARE      EMERGENCY DEPARTMENT ENCOUNTER              Pt Name: rDew Carrasco   MRN: [id number]   Birthdate 1950   Date of evaluation: 9/24/2024   Provider: Rashaun Leon DO   PCP: Red Fabian MD   Note Started: 9:01 AM EDT 9/25/24            CHIEF COMPLAINT          Chief Complaint   Patient presents with    Shortness of Breath       Pt reports to the ED with shortness of breath and fever following a chemo treatment on Tuesday 09/17/2024.               HISTORY OF PRESENT ILLNESS:   History from : Patient and Family daughter    Limitations to history : None      Drew Carrasco is a 74 y.o. male who presents to emergency department with complaints of difficulty breathing and fever.  Patient with history of small cell CA of the lung.  His dyspnea has been chronic over the last several months but states it became much worse on 9/17/2024 after chemotherapy.  On 9/17/2024 patient had his first treatment of Lubinectedin.  He states since that time he is experienced generalized bodyaches decreased appetite and worsening shortness of breath.  Patient states approximately 3 AM he developed elevated temperature of 105.  He states temperature improved with home remedies such as rubbing alcohol.  Pulse ox on arrival was 74% and was improved on 5 L of oxygen by nasal cannula..  Daughter states patient qualified for home oxygen 1 week ago however company has not yet come out to the house to provide patient with home O2.  Patient has had no chest pain abdominal pain back pain or focal pain to the extremities.  Denies syncope or near syncope.     Patient and wife speaks very broken English.  Daughter is at the bedside and provides detailed information.     Daughter states patient diagnosed with small cell CA of the

## 2024-09-25 NOTE — CONSULTS
Pulmonary Consult Note      Reason for Consult: Shortness of breath   Requesting Physician: Dr. Waterman      Subjective: Has had progressive shortness of breath following chemotherapy and radiation and is aware of his radiation pneumonitis issues. Family all at the bedside. Drew is resting in bed on supplemental oxygen.      CHIEF COMPLAINT / HPI:      The patient is a 74 y.o. male with h/o hypertension, small cell lung cancer diagnosed earlier this year s/p chemo and radiation with radiation pneumonitis that p/w ongoing shortness of breath over the last month that has worsened and fever that started yesterday. Had been started on oxygen supplementally by [facility] in the OP setting as he presented with hypoxia in their office. Fevers were new and this prompted him to come to the ER.    - Completed 4 cycles of carboplatin/etoposide and tecentriq on 5/23/24  - Radiation from 4/10/24 to 5/24/24  - Now on Lurbinectedin    In the ER, his O2 sat was in the 70's and he was placed on oxygen. Unfortunately there was an issue with his home O2 eval with [facility] and he was unable to receive home oxygen.      Past Medical History:      Diagnosis Date    Hypertension     Mass of upper lobe of lung     left lung      Past Surgical History:        Procedure Laterality Date    BOWEL RESECTION      due to severe ulcers    BRONCHOSCOPY N/A 3/7/2024    BRONCHOSCOPY/TRANSBRONCHIAL NEEDLE BIOPSY ROBOTIC performed by Uma Bautista MD at [facility] ENDOSCOPY    BRONCHOSCOPY N/A 3/7/2024    BRONCHOSCOPY ENDOBRONCHIAL ULTRASOUND performed by Uma Bautista MD at [facility] ENDOSCOPY    BRONCHOSCOPY  3/7/2024    BRONCHOSCOPY/TRANSBRONCHIAL LUNG BIOPSY ROBOTIC performed by Uma Bautista MD at [facility] ENDOSCOPY    BRONCHOSCOPY N/A 3/7/2024    BRONCHOSCOPY/TRANSBRONCHIAL NEEDLE BIOPSY performed by Uma Bautista MD at [facility] ENDOSCOPY    BRONCHOSCOPY  3/7/2024    BRONCHOSCOPY ENDOBRONCHIAL ULTRASOUND FINE NEEDLE ASPIRATION performed by Uma Bautista MD

## 2024-09-25 NOTE — FLOWSHEET NOTE
09/25/24 0904   Oxygen Therapy   SpO2 (!) 82 %  (with exertion to stand at side of bed)   Pulse Oximetry Type Continuous   O2 Device Nasal cannula   O2 Flow Rate (L/min) 2 L/min     Pt assisted to stand at side of bed to use urinal. Sob with exertion. O2 sats decreased to 82-83% on 2 L NC. After voiding, pt assisted back to bed. O2 sats increased to 97% on 2 L nc with rest.

## 2024-09-25 NOTE — CARE COORDINATION
Lokesh from [facility] called and stated [facility] did not do the walk test correctly and could not process the O2 order. Pt will need a new walk test and O2 order.

## 2024-09-25 NOTE — CONSULTS
[facility]    Pharmacy Progress Note    Vancomycin - Management by Pharmacy    Vancomycin has been discontinued by ID. Pharmacy will sign off of consult. Please re-consult pharmacy if vancomycin is resumed.    Thank you for consulting pharmacy,    Madison Layton, PharmD  Main Pharmacy = x [phone]

## 2024-09-25 NOTE — ED NOTES
ED TO INPATIENT SBAR HANDOFF    Patient Name: Drew Carrasco   :  1950  74 y.o.   MRN:  [id number]  Preferred Name    ED Room #:  10/10  Family/Caregiver Present yes   Restraints no   Sitter no   Sepsis Risk Score 51     Situation  Code Status: No Order No additional code details.    Allergies: Patient has no known allergies.  Weight: Patient Vitals for the past 96 hrs (Last 3 readings):   Weight   24 1846 84.8 kg (187 lb)     Arrived from: home  Chief Complaint:   Chief Complaint   Patient presents with    Shortness of Breath     Pt reports to the ED with shortness of breath and fever following a chemo treatment on 2024.     Hospital Problem/Diagnosis:  Principal Problem:    Acute on chronic respiratory failure with hypoxia (HCC)  Resolved Problems:    * No resolved hospital problems. *    Imaging:   XR CHEST PORTABLE   Final Result      Stable treatment-related changes. No acute consolidation.         Electronically signed by Tex Issa      CT CHEST PULMONARY EMBOLISM W CONTRAST    (Results Pending)     Abnormal labs:   Abnormal Labs Reviewed   CBC WITH AUTO DIFFERENTIAL - Abnormal; Notable for the following components:       Result Value    WBC 1.2 (*)     RBC 3.89 (*)     Hemoglobin 11.7 (*)     Hematocrit 34.5 (*)     Platelets 60 (*)     Neutrophils Absolute 0.53 (*)     Lymphocytes Absolute 0.53 (*)     All other components within normal limits   COMPREHENSIVE METABOLIC PANEL W/ REFLEX TO MG FOR LOW K - Abnormal; Notable for the following components:    Sodium 134 (*)     Glucose 129 (*)     Total Protein 6.3 (*)     ALT 72 (*)     AST 63 (*)     All other components within normal limits   LACTATE, SEPSIS - Abnormal; Notable for the following components:    Lactic Acid, Sepsis 2.3 (*)     All other components within normal limits   LACTATE, SEPSIS - Abnormal; Notable for the following components:    Lactic Acid, Sepsis 2.7 (*)     All other components within normal limits  Orientation Level: Oriented to person, Oriented to place, Oriented to time, Oriented to situation  NIH Score:    C-SSRS: Risk of Suicide: No Risk  Bedside swallow:    [place] Coma Scale (GCS): Bj Coma Scale  Eye Opening: Spontaneous  Best Verbal Response: Oriented  Best Motor Response: Obeys commands  [place] Coma Scale Score: 15  Active LDA's:   Peripheral IV 09/24/24 Left Antecubital (Active)       Peripheral IV 09/24/24 Right Antecubital (Active)                 PO Status: Nothing by Mouth  Pertinent or High Risk Medications/Drips: Vancomycin 1000mg in 250ml 0.9% NaCl @ 250ml/hr  If Yes, please provide details:   Pending Blood Product Administration: no       You may also review the ED PT Care Timeline found under the Summary Nursing Index tab.    Recommendation    Pending orders   Plan for Discharge (if known):   Additional Comments: Does not speak english well, can understand basic questions and commands, daughter speaks english  If any further questions, please call Sending RN at [phone]    Electronically signed by: Electronically signed by Konstantin Peck RN on 9/24/2024 at 9:24 PM

## 2024-09-25 NOTE — ED TRIAGE NOTES
[facility] 3 PROGRESSIVE CARE     EMERGENCY DEPARTMENT ENCOUNTER            Pt Name: Drew Carrasco   MRN: [id number]   Birthdate 1950   Date of evaluation: 9/24/2024   Provider: Rashaun Leon DO   PCP: Red Fabian MD   Note Started: 9:01 AM EDT 9/25/24          CHIEF COMPLAINT     Chief Complaint   Patient presents with    Shortness of Breath     Pt reports to the ED with shortness of breath and fever following a chemo treatment on Tuesday 09/17/2024.             HISTORY OF PRESENT ILLNESS:   History from : Patient and Family daughter    Limitations to history : None     Drew Carrasco is a 74 y.o. male who presents to emergency department with complaints of difficulty breathing and fever.  Patient with history of small cell CA of the lung.  His dyspnea has been chronic over the last several months but states it became much worse on 9/17/2024 after chemotherapy.  On 9/17/2024 patient had his first treatment of Lubinectedin.  He states since that time he is experienced generalized bodyaches decreased appetite and worsening shortness of breath.  Patient states approximately 3 AM he developed elevated temperature of 105.  He states temperature improved with home remedies such as rubbing alcohol.  Pulse ox on arrival was 74% and was improved on 5 L of oxygen by nasal cannula..  Daughter states patient qualified for home oxygen 1 week ago however company has not yet come out to the house to provide patient with home O2.  Patient has had no chest pain abdominal pain back pain or focal pain to the extremities.  Denies syncope or near syncope.    Patient and wife speaks very broken English.  Daughter is at the bedside and provides detailed information.    Daughter states patient diagnosed with small cell CA of the lung February 2024.  She states it is stage III but now questionably stage IV as liver lesions have been identified.  Patient is followed by [facility] Jami Ames.    Daughter states patient  Absolute 0.13 (*)     All other components within normal limits   CULTURE, BLOOD 1   CULTURE, BLOOD 2   COVID-19, RAPID   RAPID INFLUENZA A/B ANTIGENS   CULTURE, URINE   TROPONIN   MAGNESIUM   PROTIME-INR   VANCOMYCIN LEVEL, RANDOM   PLATELET CONFIRMATION   ADDITIONAL TESTING   BRAIN NATRIURETIC PEPTIDE   PROTIME-INR   ADDITIONAL INFORMATION   PERIPHERAL BLOOD SMEAR, PATH REVIEW      When ordered only abnormal lab results are displayed. All other labs were within normal range or not returned as of this dictation.     RADIOLOGY:      Non-plain film images such as CT, Ultrasound and MRI are read by the radiologist. Plain radiographic images are visualized and preliminarily interpreted by the ED Provider with the below findings:   Interpretation per the Radiologist below, if available at the time of this note:  CT CHEST PULMONARY EMBOLISM W CONTRAST   Final Result      1.  No evidence of pulmonary embolism.   2.  New very small bilateral pleural effusions.   3.  Stable bilateral perihilar post radiation changes with the exception of new   mild groundglass opacities posteriorly in the left lower lobe which could   represent superimposed pneumonia versus progressed radiation fibrosis compared   to 8/26/2024.         Electronically signed by Tex Issa      XR CHEST PORTABLE   Final Result      Stable treatment-related changes. No acute consolidation.         Electronically signed by Tex Issa               EKG: Sinus tachycardia at 107 bpm.  Normal CT and QT interval.  No acute ST-T wave changes noted.  No change from May 2024.    RHYTHM STRIP: Sinus tachycardia    PROCEDURES   Unless otherwise noted below, none     CRITICAL CARE TIME   I personally saw the patient and independently provided 35 minutes of non-concurrent critical care out of the total shared critical care time excluding separately billable procedures.        Vitals:    Vitals:    09/24/24 2301 09/25/24 0130 09/25/24 0615 09/25/24 0831   BP:  138/88 (!)

## 2024-09-25 NOTE — CONSULTS
Infectious Diseases Inpatient Consult Note      Reason for Consult: Neutropenic fever ongoing chemotherapy    Requesting Physician: Dr. Hitchcock      Primary Care Physician:  Red Fabian MD    History Obtained From:  Epic and pt     CHIEF COMPLAINT:     Chief Complaint   Patient presents with    Shortness of Breath     Pt reports to the ED with shortness of breath and fever following a chemo treatment on Tuesday 09/17/2024.         HISTORY OF PRESENT ILLNESS:  74 y.o. male with a significant history of hypertension, lung cancer, non-small cell radiation pneumonitis, admitted to hospital secondary shortness of breath cough not feeling well found to have fever  at home hence present to the hospital.  He is currently receiving chemotherapy for lung cancer last chemotherapy dose was on 9/17/24.  Due to worsening respiratory symptoms with fever present to the hospital, labs indicate lactic acid 2.7 procalcitonin 0.67 ALT 72 AST 63 WBC 0.5 platelets 38 blood culture in process, rapid flu negative COVID-19 negative CT chest PE protocol no pulmonary embolism bilateral perihilar radiation changes with mild groundglass opacities noted.  We are consulted for recommendations.  He was taking prednisone 10 mg before admission was tapered from  80 mg in the past x 2 months for Radiation pneumonitis per patient and family. Daughter at bed side has provided some details      Past Medical History:    Past Medical History:   Diagnosis Date    Hypertension     Mass of upper lobe of lung     left lung       Past Surgical History:    Past Surgical History:   Procedure Laterality Date    BOWEL RESECTION      due to severe ulcers    BRONCHOSCOPY N/A 3/7/2024    BRONCHOSCOPY/TRANSBRONCHIAL NEEDLE BIOPSY ROBOTIC performed by Uma Bautista MD at [facility] ENDOSCOPY    BRONCHOSCOPY N/A 3/7/2024    BRONCHOSCOPY ENDOBRONCHIAL ULTRASOUND performed by Uma Bautista MD at [facility] ENDOSCOPY    BRONCHOSCOPY  3/7/2024

## 2024-09-25 NOTE — PLAN OF CARE
Problem: Discharge Planning  Goal: Discharge to home or other facility with appropriate resources  9/25/2024 0821 by Jennie Mckeon RN  Outcome: Progressing  9/25/2024 0038 by Chery Galaviz RN  Flowsheets (Taken 9/25/2024 0038)  Discharge to home or other facility with appropriate resources: Identify barriers to discharge with patient and caregiver     Problem: Safety - Adult  Goal: Free from fall injury  9/25/2024 0821 by Jennie Mckeon RN  Outcome: Progressing  9/25/2024 0038 by Chery Galaviz RN  Outcome: Progressing  Flowsheets (Taken 9/25/2024 0038)  Free From Fall Injury: Instruct family/caregiver on patient safety     Problem: Respiratory - Adult  Goal: Achieves optimal ventilation and oxygenation  9/25/2024 0821 by Jennie Mckeon RN  Outcome: Progressing  9/25/2024 0038 by Chery Galaviz RN  Outcome: Progressing  Flowsheets (Taken 9/25/2024 0038)  Achieves optimal ventilation and oxygenation:   Assess for changes in respiratory status   Position to facilitate oxygenation and minimize respiratory effort   Assess for changes in mentation and behavior   Oxygen supplementation based on oxygen saturation or arterial blood gases     Problem: Musculoskeletal - Adult  Goal: Return mobility to safest level of function  9/25/2024 0821 by Jennie Mckeon RN  Outcome: Progressing  9/25/2024 0038 by Chery Galaviz RN  Outcome: Progressing  Flowsheets (Taken 9/25/2024 0038)  Return Mobility to Safest Level of Function: Assess patient stability and activity tolerance for standing, transferring and ambulating with or without assistive devices     Problem: Infection - Adult  Goal: Absence of infection at discharge  9/25/2024 0821 by Jennie Mckeon RN  Outcome: Progressing  9/25/2024 0038 by Chery Galaviz RN  Outcome: Progressing  Flowsheets (Taken 9/25/2024 0038)  Absence of infection at discharge:   Assess and monitor for signs and symptoms of infection   Monitor lab/diagnostic results  Goal: Absence of  infection during hospitalization  Outcome: Progressing     Problem: Metabolic/Fluid and Electrolytes - Adult  Goal: Electrolytes maintained within normal limits  Outcome: Progressing     Problem: Hematologic - Adult  Goal: Maintains hematologic stability  Outcome: Progressing

## 2024-09-25 NOTE — PROGRESS NOTES
[facility]    Pharmacy Progress Note    Admit Date: 9/24/2024    List of of current medications patient is taking is complete. Home Medication list in EPIC updated to reflect changes noted below.    Source of information: Daughter (POA), Surescripts    Patient's home pharmacy: [facility] [place], [place] ([phone])    Changes made to medication list:   Medications removed: (include reason, ex: therapy completed, patient no longer taking, etc.)  Albuterol nebulizer - pt using the albuterol inhaler  Ondansetron - pt not taking  Sucralfate - pt not taking, was taking with radiation  Medications added:   Albuterol inhaler  Loratadine - pt takes as needed for seasonal allergies  Tylenol as needed  Medication doses adjusted:   Omeprazole 20 mg once a week updated to Omeprazole 40 mg daily  Other notes:   Daughter reports patient last took amlodipine on 9/17/24 and does not take if blood pressure is low.  Daughter reports patient recently took Tylenol and loratadine, but these medications are not taken consistently  Patient has seasonal allergies, but no known medication allergies  Per daughter, patient takes prednisone for pneumonitis symptoms. Has been taking prednisone 10 mg daily since 9/1/24.    Current Outpatient Medications   Medication Instructions    acetaminophen (TYLENOL) 1,000 mg, Oral, DAILY PRN    albuterol sulfate HFA (VENTOLIN HFA) 108 (90 Base) MCG/ACT inhaler 1 puff, Inhalation, every 4 to 6 hours as needed for shortness of breath or wheezing.    amLODIPine (NORVASC) 5 mg, Oral, DAILY    loratadine (CLARITIN) 10 mg, Oral, DAILY PRN    omeprazole (PRILOSEC) 40 mg, Oral, DAILY    predniSONE (DELTASONE) 10 mg, Oral, DAILY    promethazine (PHENERGAN) 25 mg, Oral, EVERY 6 HOURS PRN    tamsulosin (FLOMAX) 0.4 mg, Oral, DAILY       Please call with any questions.    Madison Layton, PharmD  Main Pharmacy = x [phone]

## 2024-09-25 NOTE — PROGRESS NOTES
4 Eyes Skin Assessment     The patient is being assess for  Admission    I agree that 2 RN's have performed a thorough Head to Toe Skin Assessment on the patient. ALL assessment sites listed below have been assessed.       Areas assessed by both nurses:   [x]   Head, Face, and Ears   [x]   Shoulders, Back, and Chest  [x]   Arms, Elbows, and Hands   [x]   Coccyx, Sacrum, and Ischum  [x]   Legs, Feet, and Heels        Does the Patient have Skin Breakdown?  No         Ori Prevention initiated:  No   Wound Care Orders initiated:  No      [facility] nurse consulted for Pressure Injury (Stage 3,4, Unstageable, DTI, NWPT, and Complex wounds):  No      Nurse 1 eSignature: Electronically signed by Chery Galaviz RN on 9/25/24 at 4:04 AM EDT    **SHARE this note so that the co-signing nurse is able to place an eSignature**    Nurse 2 eSignature: Electronically signed by Aditya Neely RN on 9/25/24 at 4:05 AM EDT

## 2024-09-25 NOTE — CONSULTS
[facility]    Consult Note      Requesting Physician:  Hi Waterman    CHIEF COMPLAINT:  SOB      HISTORY OF PRESENT ILLNESS:    Mr. Carrasco  is a 74 y.o. male we are seeing in consultation for Small Cell Lung Cancer. He is followed by Dr. Ames as an outpatient, last seen in office 9/17/24 to start treatment with Lurbinectedin. He declined to schedule 1 week tox check, instead stating he would call if he had side effects he could not manage at home.     He presented to the ED yesterday with c/o SOB. He has associated non-productive cough, slightly worse than baseline.  He additionally notes having a fever of 38 C at home. He had some associated nausea, chills and rigors overnight prior.     In the ER, pt mildly hypertensive, afebrile, HR 100s, O2 reported at 72% on room air, placed immediately on 5L via NC.  Labs remarkable for lactic 2.3, procalcitonin 0.67, ALT/AST mildly elevated, WBC 1.2.  Urinalysis was positive.     Today he is seen with his daughter and wife at the bedside. Daughter provides translation. He reports he is feeling better than yesterday. He did not feel well in general following the first cycle of chemotherapy. C/o constipation, fatigue.       Past Medical History:  Past Medical History:   Diagnosis Date    Hypertension     Mass of upper lobe of lung     left lung       Past Surgical History:  Past Surgical History:   Procedure Laterality Date    BOWEL RESECTION      due to severe ulcers    BRONCHOSCOPY N/A 3/7/2024    BRONCHOSCOPY/TRANSBRONCHIAL NEEDLE BIOPSY ROBOTIC performed by Uma Bautista MD at [facility] ENDOSCOPY    BRONCHOSCOPY N/A 3/7/2024    BRONCHOSCOPY ENDOBRONCHIAL ULTRASOUND performed by Uma Bautista MD at [facility] ENDOSCOPY    BRONCHOSCOPY  3/7/2024    BRONCHOSCOPY/TRANSBRONCHIAL LUNG BIOPSY ROBOTIC performed by Uma Bautista MD at [facility] ENDOSCOPY  ([id number])  3,375 mg IntraVENous q8h Hi Waterman MD   Stopped at 09/25/24 0658       Allergies:  No Known Allergies    Social History:  Social History     Socioeconomic History    Marital status:      Spouse name: Not on file    Number of children: Not on file    Years of education: Not on file    Highest education level: Not on file   Occupational History    Not on file   Tobacco Use    Smoking status: Not on file    Smokeless tobacco: Not on file   Substance and Sexual Activity    Alcohol use: Not on file    Drug use: Not on file    Sexual activity: Not on file   Other Topics Concern    Not on file   Social History Narrative    Not on file     Social Determinants of Health     Financial Resource Strain: Not on file   Food Insecurity: Unknown (1/22/2024)    Received from [employer]     Food Insecurities     Worried about running out of food: Not on file     Food Bought: Not on file   Transportation Needs: Unknown (1/22/2024)    Received from [employer]     Transportation     Worried about transportation: Not on file   Physical Activity: Not on file   Stress: Not on file   Social Connections: Not on file   Intimate Partner Violence: Unknown (1/22/2024)    Received from [employer]     Interpersonal Safety     Feel physically or emotionally unsafe where currently live: Not on file     Harm by anyone: Not on file     Emotionally Harmed: Not on file   Housing Stability: Unknown (1/22/2024)    Received from [employer]     Housing/Utilities     Worried about losing home: Not on file     Stayed outside house: Not on file     Unable to get utilities: Not on file          Family History:  No family history on file.    REVIEW OF SYSTEMS:    Review of Systems   Constitutional:  Positive for fatigue.   Respiratory:  Positive for cough and shortness of breath.    Gastrointestinal:  Positive for constipation.         PHYSICAL EXAM:      Vitals:  Patient Vitals for the past 24

## 2024-09-25 NOTE — H&P
HISTORY & PHYSICAL        Date of Admission:  09/24/24  MRN: [id number]  Date of Service: 09/24/24  Location:  [facility]       CC:    Chief Complaint   Patient presents with    Shortness of Breath     Pt reports to the ED with shortness of breath and fever following a chemo treatment on Tuesday 09/17/2024.        HISTORY OF PRESENT ILLNESS:     Drew Carrasco is a 74 y.o. male with a PMHx of HTN, small cell lung cancer, non-productive cough, radiation pneumonitis, and arrhythmia who presented for evaluation of worsening shortness of breath.  He has associated non-productive cough, slightly worse than baseline.  He additionally notes having a fever of 38 C at home earlier today (though at first he stated \"105 degrees\").  He had some associated nausea, chills and rigors overnight last night.  He reported some increased nocturia to his oncology team last week, which was attributed to BPH.  He otherwise denies any congestion, headache, vomiting, chest pain, abdominal pain, diarrhea, dysuria, or leg swelling.   Daughters at bedside explain that he qualified for home oxygen recently when he became hypoxic with ambulation.  Oxygen was unfortunately ordered to an incorrect address, which has now been corrected and is anticipated to arrive tomorrow.  Last chemotherapy session was on 9/17/24.  He is followed by [facility], Dr. Hardwick.       In the ER, pt mildly hypertensive, afebrile, HR 100s, O2 reported at 72% on room air, placed immediately on 5L via NC.  Labs remarkable for lactic 2.3, procalcitonin 0.67, ALT/AST mildly elevated, WBC 1.2.  Urinalysis was positive.  CXR was negative for acute findings.  CTPA pending.  Treated with IV cefepime, vancomycin, decadron, and albuterol nebs.      Case discussed with ED attending for admission.  External medical records  less than 2 seconds.   Neurological:      General: No focal deficit present.      Mental Status: He is alert and oriented to person, place, and time.   Psychiatric:         Behavior: Behavior normal.           LABS / IMAGING:     Recent Labs     09/24/24 1911   WBC 1.2*   HGB 11.7*   HCT 34.5*   PLT 60*     Recent Labs     09/24/24 1911   *   K 4.0      CO2 21   BUN 15   CREATININE 1.0   CALCIUM 8.6   PHOS 2.0*     Recent Labs     09/24/24 1911   AST 63*   ALT 72*   BILITOT 0.9   ALKPHOS 71     Recent Labs     09/24/24 1911 09/24/24  2102   PROBNP  --  91   INR 1.0  --      Recent Labs     09/24/24 1911   NITRU POSITIVE*   COLORU Yellow   PHUR 6.0   WBCUA 6 TO 9*   RBCUA 0 TO 2   LEUKOCYTESUR SMALL*   GLUCOSEU NEGATIVE   KETUA NEGATIVE       XR CHEST PORTABLE   Final Result      Stable treatment-related changes. No acute consolidation.         Electronically signed by Tex Issa      CT CHEST PULMONARY EMBOLISM W CONTRAST    (Results Pending)       ASSESSMENT / PLAN:     Acute respiratory failure with hypoxia in setting of   Small cell lung cancer     UTI  Neutropenia   Hx of radiation pneumonitis     No acute respi distress, likely 2/2 malignancy.  Anticipating home oxygen arrival tomorrow.      UA +Nit, +LE.  Fever at home, thus far normothermic since admission.   Mod neutropenia.  Rapid flu/Covid neg.     - repeat procalc in AM, monitor fever curve  - follow blood and urine cx   - c/w IV Zosyn pending cx  - xopenex q6hwa   - mucinex BID  - c/w home prednisone  - O2 via NC, keep sats >90%  - defer neupogen to hem/onc team  - neutropenic precautions  - pulmonology, ID, and hem/onc evaluation       Dispo: Admit to Inpatient med/surg.  Expected LOS greater than 2 midnights.  PPx:  lovenox  PT/OT:  Not indicated   Code status:  No Order         Hi Waterman MD  Hospitalist

## 2024-09-25 NOTE — PROGRESS NOTES
Patient admitted to room 3224 from ED.  Patient oriented to room, call light, bed rails, phone, lights and bathroom. Bed alarm in place, patient aware of placement and reason. Telemetry box  in place, patient aware of placement and reason.  Bed locked, in lowest position, side rails up 2/4, call light within reach. Chery Galaviz RN      10:35 PM  Dr Waterman at bedside talking with patient and family. Chery Galaviz RN    10:48 PM  Shift assessment complete. (See findings in flowsheet). Med pass complete. (See MAR). VSS. Patient with no complaints at this time. Chery Galaviz RN

## 2024-09-25 NOTE — PLAN OF CARE
Problem: Discharge Planning  Goal: Discharge to home or other facility with appropriate resources  Flowsheets (Taken 9/25/2024 0038)  Discharge to home or other facility with appropriate resources: Identify barriers to discharge with patient and caregiver     Problem: Safety - Adult  Goal: Free from fall injury  Outcome: Progressing  Flowsheets (Taken 9/25/2024 0038)  Free From Fall Injury: Instruct family/caregiver on patient safety     Problem: Respiratory - Adult  Goal: Achieves optimal ventilation and oxygenation  Outcome: Progressing  Flowsheets (Taken 9/25/2024 0038)  Achieves optimal ventilation and oxygenation:   Assess for changes in respiratory status   Position to facilitate oxygenation and minimize respiratory effort   Assess for changes in mentation and behavior   Oxygen supplementation based on oxygen saturation or arterial blood gases     Problem: Musculoskeletal - Adult  Goal: Return mobility to safest level of function  Outcome: Progressing  Flowsheets (Taken 9/25/2024 0038)  Return Mobility to Safest Level of Function: Assess patient stability and activity tolerance for standing, transferring and ambulating with or without assistive devices     Problem: Infection - Adult  Goal: Absence of infection at discharge  Outcome: Progressing  Flowsheets (Taken 9/25/2024 0038)  Absence of infection at discharge:   Assess and monitor for signs and symptoms of infection   Monitor lab/diagnostic results

## 2024-09-26 LAB
ACINETOBACTER CALCOACETICUS-BAUMANNII BY PCR: NOT DETECTED
ADDITIONAL COMMENT:: NORMAL
ADDITIONAL COMMENT:: NORMAL
ADENOVIRUS: NOT DETECTED
ANION GAP SERPL CALCULATED.3IONS-SCNC: 9 MMOL/L (ref 3–16)
BASOPHILS # BLD: 0 K/UL (ref 0–0.2)
BASOPHILS NFR BLD: 0 %
BUN SERPL-MCNC: 15 MG/DL (ref 7–20)
CALCIUM SERPL-MCNC: 8.6 MG/DL (ref 8.3–10.6)
CHLAMYDIA PNEUMONIAE BY PCR: NOT DETECTED
CHLORIDE SERPL-SCNC: 108 MMOL/L (ref 99–110)
CO2 SERPL-SCNC: 21 MMOL/L (ref 21–32)
CORONAVIRUS PCR: NOT DETECTED
CREAT SERPL-MCNC: 0.8 MG/DL (ref 0.8–1.3)
EKG ATRIAL RATE: 107 BPM
EKG DIAGNOSIS: NORMAL
EKG P AXIS: 18 DEGREES
EKG P-R INTERVAL: 160 MS
EKG Q-T INTERVAL: 344 MS
EKG QRS DURATION: 80 MS
EKG QTC CALCULATION (BAZETT): 459 MS
EKG R AXIS: -5 DEGREES
EKG T AXIS: 20 DEGREES
EKG VENTRICULAR RATE: 107 BPM
ENTEROBACTER CLOACAE COMPLEX BY PCR: NOT DETECTED
EOSINOPHIL # BLD: 0.04 K/UL (ref 0–0.6)
EOSINOPHILS RELATIVE PERCENT: 4 %
ERYTHROCYTE [DISTWIDTH] IN BLOOD BY AUTOMATED COUNT: 14.1 % (ref 12.4–15.4)
ESCHERICHIA COLI BY PCR: NOT DETECTED
GFR, ESTIMATED: >90 ML/MIN/1.73M2
GLUCOSE SERPL-MCNC: 164 MG/DL (ref 70–99)
GP B STREP DNA SPT NAA+NON-PRB-NCNCRNG: NOT DETECTED
HAEMOPHILUS INFLUENZAE BY PCR: NOT DETECTED
HCT VFR BLD AUTO: 29.4 % (ref 40.5–52.5)
HGB BLD-MCNC: 10 G/DL (ref 13.5–17.5)
HUMAN RHINOVIRUS/ENTEROVIRUS BY PCR: NOT DETECTED
INFLUENZA A BY PCR: NOT DETECTED
INFLUENZA B BY PCR: NOT DETECTED
K AEROGENES DNA BAL NAA+NON-PRB-NCNCRNG: NOT DETECTED
K OXYTOCA DNA SPT NAA+NON-PRB-NCNCRNG: NOT DETECTED
K PNEU GRP DNA SPT NAA+NON-PRB-NCNCRNG: NOT DETECTED
LEGIONELLA PNEUMOPHILIA BY PCR: NOT DETECTED
LYMPHOCYTES NFR BLD: 0.21 K/UL (ref 1–5.1)
LYMPHOCYTES RELATIVE PERCENT: 21 %
M CATARRHALIS DNA BAL NAA+NON-PRB-NCNCRNG: NOT DETECTED
MCH RBC QN AUTO: 30.2 PG (ref 26–34)
MCHC RBC AUTO-ENTMCNC: 34 G/DL (ref 31–36)
MCV RBC AUTO: 88.8 FL (ref 80–100)
METAPNEUMOVIRUS BY PCR: NOT DETECTED
MICROORGANISM SPEC CULT: NO GROWTH
MONOCYTES NFR BLD: 0.06 K/UL (ref 0–1.3)
MONOCYTES NFR BLD: 6 %
MYCOPLASMA PNEUMONIAE PCR: NOT DETECTED
NEUTROPHILS NFR BLD: 69 %
NEUTS SEG NFR BLD: 0.69 K/UL (ref 1.7–7.7)
P AERUGINOSA DNA SPT NAA+NON-PRB-NCNCRNG: NOT DETECTED
PARAINFLUENZA VIRUS BY PCR: NOT DETECTED
PLATELET # BLD AUTO: 34 K/UL (ref 135–450)
PLATELET CONFIRMATION: NORMAL
PLATELET ESTIMATE: NORMAL
PMV BLD AUTO: 10.6 FL
POTASSIUM SERPL-SCNC: 4.4 MMOL/L (ref 3.5–5.1)
PROTEUS SP DNA BAL NAA+NON-PRB-NCNCRNG: NOT DETECTED
RBC # BLD AUTO: 3.31 M/UL (ref 4.2–5.9)
RBC # BLD: NORMAL 10*6/UL
RSV BY PCR: NOT DETECTED
S AUREUS DNA SPT NAA+NON-PRB-NCNCRNG: NOT DETECTED
S MARCESCENS DNA SPT NAA+NON-PRB-NCNCRNG: NOT DETECTED
S PNEUM AG SPEC QL: NEGATIVE
S PNEUM DNA BAL NAA+NON-PRB-NCNCRNG: NOT DETECTED
S PYO DNA SPT NAA+NON-PRB-NCNCRNG: NOT DETECTED
SERVICE CMNT-IMP: NORMAL
SODIUM SERPL-SCNC: 137 MMOL/L (ref 136–145)
SOURCE: NORMAL
SPECIMEN DESCRIPTION: NORMAL
SPECIMEN SOURCE: NORMAL
WBC # BLD: ABNORMAL 10*3/UL
WBC OTHER # BLD: 1 K/UL (ref 4–11)

## 2024-09-26 PROCEDURE — 6370000000 HC RX 637 (ALT 250 FOR IP): Performed by: INTERNAL MEDICINE

## 2024-09-26 PROCEDURE — 80048 BASIC METABOLIC PNL TOTAL CA: CPT

## 2024-09-26 PROCEDURE — 2700000000 HC OXYGEN THERAPY PER DAY

## 2024-09-26 PROCEDURE — 99233 SBSQ HOSP IP/OBS HIGH 50: CPT | Performed by: INTERNAL MEDICINE

## 2024-09-26 PROCEDURE — 6370000000 HC RX 637 (ALT 250 FOR IP): Performed by: NURSE PRACTITIONER

## 2024-09-26 PROCEDURE — 85025 COMPLETE CBC W/AUTO DIFF WBC: CPT

## 2024-09-26 PROCEDURE — 94640 AIRWAY INHALATION TREATMENT: CPT

## 2024-09-26 PROCEDURE — 2060000000 HC ICU INTERMEDIATE R&B

## 2024-09-26 PROCEDURE — 6360000002 HC RX W HCPCS: Performed by: INTERNAL MEDICINE

## 2024-09-26 PROCEDURE — 36415 COLL VENOUS BLD VENIPUNCTURE: CPT

## 2024-09-26 PROCEDURE — 6360000002 HC RX W HCPCS: Performed by: STUDENT IN AN ORGANIZED HEALTH CARE EDUCATION/TRAINING PROGRAM

## 2024-09-26 PROCEDURE — 94761 N-INVAS EAR/PLS OXIMETRY MLT: CPT

## 2024-09-26 PROCEDURE — 2580000003 HC RX 258: Performed by: INTERNAL MEDICINE

## 2024-09-26 RX ORDER — POLYETHYLENE GLYCOL 3350 17 G/17G
17 POWDER, FOR SOLUTION ORAL 2 TIMES DAILY
Status: DISCONTINUED | OUTPATIENT
Start: 2024-09-26 | End: 2024-09-27 | Stop reason: HOSPADM

## 2024-09-26 RX ADMIN — LEVALBUTEROL 1.25 MG: 1.25 SOLUTION, CONCENTRATE RESPIRATORY (INHALATION) at 14:39

## 2024-09-26 RX ADMIN — PIPERACILLIN AND TAZOBACTAM 3375 MG: 3; .375 INJECTION, POWDER, LYOPHILIZED, FOR SOLUTION INTRAVENOUS at 05:52

## 2024-09-26 RX ADMIN — PIPERACILLIN AND TAZOBACTAM 3375 MG: 3; .375 INJECTION, POWDER, LYOPHILIZED, FOR SOLUTION INTRAVENOUS at 21:42

## 2024-09-26 RX ADMIN — LEVALBUTEROL 1.25 MG: 1.25 SOLUTION, CONCENTRATE RESPIRATORY (INHALATION) at 21:25

## 2024-09-26 RX ADMIN — PIPERACILLIN AND TAZOBACTAM 3375 MG: 3; .375 INJECTION, POWDER, LYOPHILIZED, FOR SOLUTION INTRAVENOUS at 13:13

## 2024-09-26 RX ADMIN — GUAIFENESIN 600 MG: 600 TABLET, EXTENDED RELEASE ORAL at 21:40

## 2024-09-26 RX ADMIN — POLYETHYLENE GLYCOL 3350 17 G: 17 POWDER, FOR SOLUTION ORAL at 14:33

## 2024-09-26 RX ADMIN — FILGRASTIM-AAFI 300 MCG: 300 INJECTION, SOLUTION SUBCUTANEOUS at 17:40

## 2024-09-26 RX ADMIN — LEVALBUTEROL 1.25 MG: 1.25 SOLUTION, CONCENTRATE RESPIRATORY (INHALATION) at 08:22

## 2024-09-26 RX ADMIN — PREDNISONE 10 MG: 10 TABLET ORAL at 08:44

## 2024-09-26 RX ADMIN — LEVOFLOXACIN 750 MG: 5 INJECTION, SOLUTION INTRAVENOUS at 09:49

## 2024-09-26 RX ADMIN — GUAIFENESIN 600 MG: 600 TABLET, EXTENDED RELEASE ORAL at 08:44

## 2024-09-26 NOTE — PROGRESS NOTES
No acute events over night. Pt is A&Ox4, able to make his needs known, very pleasant. VSS. NSR to ST on tele over night. Pt continues to require 2L NC.   Oxygen demand an HR continue to increase with any exertion from the pt, he does recover almost immediately when he returns to rest.   The pt did not appear to sleep at all last night- he stated d/t the frequency of staff going in the room for vitals, meds, labs etc. Pts wife is at the bedside this morning. Gripper socks on, call light in reach.     Aditya Neely, RN

## 2024-09-26 NOTE — PROGRESS NOTES
Infectious Diseases Inpatient Progress  Note          CHIEF COMPLAINT:     Neutropenic fever  Hypoxic respiratory failure  Pneumonia  Radiation pneumonitis  Lung cancer  Pancytopenia  Ongoing chemotherapy    HISTORY OF PRESENT ILLNESS:  74 y.o. male with a significant history of hypertension, lung cancer, non-small cell radiation pneumonitis, admitted to hospital secondary shortness of breath cough not feeling well found to have fever  at home hence present to the hospital.  He is currently receiving chemotherapy for lung cancer last chemotherapy dose was on 9/17/24.  Due to worsening respiratory symptoms with fever present to the hospital, labs indicate lactic acid 2.7 procalcitonin 0.67 ALT 72 AST 63 WBC 0.5 platelets 38 blood culture in process, rapid flu negative COVID-19 negative CT chest PE protocol no pulmonary embolism bilateral perihilar radiation changes with mild groundglass opacities noted.  We are consulted for recommendations.  He was taking prednisone 10 mg before admission was tapered from  80 mg in the past x 2 months for Radiation pneumonitis per patient and family. Daughter at bed side has provided some details      Interval history: No recorded fever creatinine stable at 0.8 blood culture in process WBC now slowly improving remains on nasal cannula 2 L sputum culture in process urine antigens negative MRSA probe negative family is at bedside requesting he be discharged      Past Medical History:    Past Medical History:   Diagnosis Date    Hypertension     Mass of upper lobe of lung     left lung       Past Surgical History:    Past Surgical History:   Procedure Laterality Date    BOWEL RESECTION      due to severe ulcers    BRONCHOSCOPY N/A 3/7/2024    BRONCHOSCOPY/TRANSBRONCHIAL NEEDLE BIOPSY ROBOTIC performed by Uma Bautista MD at [facility] ENDOSCOPY    BRONCHOSCOPY N/A 3/7/2024    BRONCHOSCOPY ENDOBRONCHIAL ULTRASOUND performed by Uma Bautista MD at [facility] ENDOSCOPY    BRONCHOSCOPY

## 2024-09-26 NOTE — PLAN OF CARE
Problem: Safety - Adult  Goal: Free from fall injury  Outcome: Progressing   Patient free from fall injury this shift.      Problem: Respiratory - Adult  Goal: Achieves optimal ventilation and oxygenation  Outcome: Progressing   Patient achieves optimal ventilation with 2L NC.      Problem: Musculoskeletal - Adult  Goal: Return mobility to safest level of function  Outcome: Progressing   Patients mobility returns to pre hospitalization.

## 2024-09-26 NOTE — PROGRESS NOTES
Report received from day shift RN. Patient resting comfortably in bed. No signs of discomfort or distress. Bed is in lowest position, wheels locked, 2/2 side rails up. Bedside table and call light within reach. White board updated.  Chery Galaviz RN    10:03 PM  Shift assessment complete. (See findings in flowsheet). Med pass complete. (See MAR). VSS. Patient with no complaints at this time. Chery Galaviz RN    5:21 AM  No new events overnight. Patient resting quietly in bed with eyes closed. VSS. No needs at this time. Chery Galaviz RN

## 2024-09-26 NOTE — PROGRESS NOTES
Pulmonary & Critical Care Medicine    Admit Date: 2024  PCP: Red Fabian MD    CC:  pneumonia   Events of Last 24 hours:   He had fever at home, it is better now.  Overall he feels better.  His main complain is ANGLIN.    He has cough with mild sputum production.  At baseline he has dry cough due to radiation pneumonitis     Vitals:  Tmax:  VITALS:  BP (!) 146/71   Pulse (!) 109   Temp 97.3 °F (36.3 °C) (Oral)   Resp 18   Ht 1.702 m (5' 7\")   Wt 84.8 kg (187 lb)   SpO2 100%   BMI 29.29 kg/m²   24HR INTAKE/OUTPUT:    Intake/Output Summary (Last 24 hours) at 2024 1113  Last data filed at 2024 0615  Gross per 24 hour   Intake 480 ml   Output 1600 ml   Net -1120 ml     CURRENT PULSE OXIMETRY:  SpO2: 100 %  24HR PULSE OXIMETRY RANGE:  SpO2  Av.3 %  Min: 94 %  Max: 100 %    EXAM:  General: No distress. Alert.  Eyes: PERRL. No sclera icterus. No conjunctival injection.  ENT: No discharge. Moist oral mucosa   Neck: Trachea midline. Neck is supple   Resp: No accessory muscle use. Scattered rales.  No wheezing.   CV: Regular rate. Regular rhythm. No mumur or rub. No edema.   GI: Non-tender. Non-distended.  Normal bowel sounds.   Neuro: Awake. Speech is clear.    Psych: No anxiety or agitation.     Medications:    IV:   sodium chloride           Scheduled Meds:   filgrastim/filgrastim biosimilar  300 mcg SubCUTAneous QPM    levofloxacin  750 mg IntraVENous Q24H    predniSONE  10 mg Oral Daily    levalbuterol  1.25 mg Nebulization Q6H WA RT    guaiFENesin  600 mg Oral BID    piperacillin-tazobactam  3,375 mg IntraVENous q8h         Diet: ADULT DIET; Regular     Results:  CBC:   Recent Labs     24  1911 24  0554 24  0555   WBC 1.2* 0.5* 1.0*   HGB 11.7* 11.2* 10.0*   HCT 34.5* 32.7* 29.4*   MCV 88.7 89.3 88.8   PLT 60* 38* 34*     BMP:   Recent Labs     24  0554 24  0555   * 137 137   K 4.0 4.6 4.4    107 108   CO2 21 20* 21   PHOS 2.0*

## 2024-09-27 VITALS
BODY MASS INDEX: 29.35 KG/M2 | HEIGHT: 67 IN | HEART RATE: 103 BPM | WEIGHT: 187 LBS | TEMPERATURE: 98.1 F | SYSTOLIC BLOOD PRESSURE: 128 MMHG | RESPIRATION RATE: 20 BRPM | DIASTOLIC BLOOD PRESSURE: 85 MMHG | OXYGEN SATURATION: 99 %

## 2024-09-27 LAB
1,3 BETA GLUCAN SER-MCNC: <31 PG/ML
1,3 BETA-D-GLUCAN INTERP: NEGATIVE
ADDITIONAL COMMENT:: NORMAL
ANION GAP SERPL CALCULATED.3IONS-SCNC: 10 MMOL/L (ref 3–16)
BASOPHILS # BLD: 0 K/UL (ref 0–0.2)
BASOPHILS NFR BLD: 0 %
BUN SERPL-MCNC: 15 MG/DL (ref 7–20)
CALCIUM SERPL-MCNC: 8.8 MG/DL (ref 8.3–10.6)
CHLORIDE SERPL-SCNC: 106 MMOL/L (ref 99–110)
CMV QNT BY NAAT, PLASMA INTERP: DETECTED
CMV QNT BY NAAT, PLASMA IU/ML: ABNORMAL IU/ML
CMV QNT BY NAAT, PLASMA LOG IU/ML: ABNORMAL LOG IU/ML
CO2 SERPL-SCNC: 23 MMOL/L (ref 21–32)
CREAT SERPL-MCNC: 1 MG/DL (ref 0.8–1.3)
EOSINOPHIL # BLD: 0.1 K/UL (ref 0–0.6)
EOSINOPHILS RELATIVE PERCENT: 8 %
ERYTHROCYTE [DISTWIDTH] IN BLOOD BY AUTOMATED COUNT: 14.2 % (ref 12.4–15.4)
GFR, ESTIMATED: 80 ML/MIN/1.73M2
GLUCOSE SERPL-MCNC: 107 MG/DL (ref 70–99)
HCT VFR BLD AUTO: 31.5 % (ref 40.5–52.5)
HGB BLD-MCNC: 10.5 G/DL (ref 13.5–17.5)
IMM GRANULOCYTES # BLD AUTO: 0.02 K/UL (ref 0–0.5)
IMM GRANULOCYTES NFR BLD: 2 %
LYMPHOCYTES NFR BLD: 0.35 K/UL (ref 1–5.1)
LYMPHOCYTES RELATIVE PERCENT: 27 %
MCH RBC QN AUTO: 30.1 PG (ref 26–34)
MCHC RBC AUTO-ENTMCNC: 33.3 G/DL (ref 31–36)
MCV RBC AUTO: 90.3 FL (ref 80–100)
MICROORGANISM SPEC CULT: ABNORMAL
MICROORGANISM/AGENT SPEC: ABNORMAL
MONOCYTES NFR BLD: 0.11 K/UL (ref 0–1.3)
MONOCYTES NFR BLD: 9 %
NEUTROPHILS NFR BLD: 55 %
NEUTS SEG NFR BLD: 0.72 K/UL (ref 1.7–7.7)
PLATELET # BLD AUTO: 31 K/UL (ref 135–450)
PLATELET CONFIRMATION: NORMAL
PLATELET ESTIMATE: NORMAL
PMV BLD AUTO: 10.3 FL
POTASSIUM SERPL-SCNC: 4.2 MMOL/L (ref 3.5–5.1)
RBC # BLD AUTO: 3.49 M/UL (ref 4.2–5.9)
RBC # BLD: NORMAL 10*6/UL
SODIUM SERPL-SCNC: 139 MMOL/L (ref 136–145)
SPECIMEN DESCRIPTION: ABNORMAL
WBC # BLD: NORMAL 10*3/UL
WBC OTHER # BLD: 1.3 K/UL (ref 4–11)

## 2024-09-27 PROCEDURE — 99233 SBSQ HOSP IP/OBS HIGH 50: CPT | Performed by: INTERNAL MEDICINE

## 2024-09-27 PROCEDURE — 2580000003 HC RX 258: Performed by: INTERNAL MEDICINE

## 2024-09-27 PROCEDURE — 6360000002 HC RX W HCPCS: Performed by: INTERNAL MEDICINE

## 2024-09-27 PROCEDURE — 2700000000 HC OXYGEN THERAPY PER DAY

## 2024-09-27 PROCEDURE — 6360000002 HC RX W HCPCS: Performed by: NURSE PRACTITIONER

## 2024-09-27 PROCEDURE — 85025 COMPLETE CBC W/AUTO DIFF WBC: CPT

## 2024-09-27 PROCEDURE — 99231 SBSQ HOSP IP/OBS SF/LOW 25: CPT | Performed by: NURSE PRACTITIONER

## 2024-09-27 PROCEDURE — 6370000000 HC RX 637 (ALT 250 FOR IP): Performed by: NURSE PRACTITIONER

## 2024-09-27 PROCEDURE — 6370000000 HC RX 637 (ALT 250 FOR IP): Performed by: INTERNAL MEDICINE

## 2024-09-27 PROCEDURE — 80048 BASIC METABOLIC PNL TOTAL CA: CPT

## 2024-09-27 PROCEDURE — 36415 COLL VENOUS BLD VENIPUNCTURE: CPT

## 2024-09-27 PROCEDURE — 94618 PULMONARY STRESS TESTING: CPT

## 2024-09-27 PROCEDURE — 94761 N-INVAS EAR/PLS OXIMETRY MLT: CPT

## 2024-09-27 PROCEDURE — 94640 AIRWAY INHALATION TREATMENT: CPT

## 2024-09-27 RX ORDER — BISACODYL 10 MG
10 SUPPOSITORY, RECTAL RECTAL ONCE
Status: COMPLETED | OUTPATIENT
Start: 2024-09-27 | End: 2024-09-27

## 2024-09-27 RX ORDER — GUAIFENESIN 600 MG/1
600 TABLET, EXTENDED RELEASE ORAL 2 TIMES DAILY
Qty: 14 TABLET | Refills: 0 | Status: SHIPPED | OUTPATIENT
Start: 2024-09-27 | End: 2024-10-04

## 2024-09-27 RX ORDER — LEVOFLOXACIN 500 MG/1
500 TABLET, FILM COATED ORAL DAILY
Qty: 5 TABLET | Refills: 0 | Status: SHIPPED | OUTPATIENT
Start: 2024-09-27 | End: 2024-10-02

## 2024-09-27 RX ORDER — LEVALBUTEROL 1.25 MG/.5ML
1.25 SOLUTION, CONCENTRATE RESPIRATORY (INHALATION)
Qty: 90 EACH | Refills: 0 | Status: SHIPPED | OUTPATIENT
Start: 2024-09-27 | End: 2024-10-27

## 2024-09-27 RX ORDER — ONDANSETRON 4 MG/1
8 TABLET, ORALLY DISINTEGRATING ORAL EVERY 8 HOURS PRN
Qty: 30 TABLET | Refills: 0
Start: 2024-09-27 | End: 2024-10-27

## 2024-09-27 RX ADMIN — LEVOFLOXACIN 750 MG: 5 INJECTION, SOLUTION INTRAVENOUS at 10:10

## 2024-09-27 RX ADMIN — LEVALBUTEROL 1.25 MG: 1.25 SOLUTION, CONCENTRATE RESPIRATORY (INHALATION) at 07:52

## 2024-09-27 RX ADMIN — GUAIFENESIN 600 MG: 600 TABLET, EXTENDED RELEASE ORAL at 09:05

## 2024-09-27 RX ADMIN — Medication 10 ML: at 09:06

## 2024-09-27 RX ADMIN — POLYETHYLENE GLYCOL 3350 17 G: 17 POWDER, FOR SOLUTION ORAL at 09:06

## 2024-09-27 RX ADMIN — PIPERACILLIN AND TAZOBACTAM 3375 MG: 3; .375 INJECTION, POWDER, LYOPHILIZED, FOR SOLUTION INTRAVENOUS at 06:46

## 2024-09-27 RX ADMIN — BISACODYL 10 MG: 10 SUPPOSITORY RECTAL at 12:26

## 2024-09-27 RX ADMIN — FILGRASTIM-AAFI 300 MCG: 300 INJECTION, SOLUTION SUBCUTANEOUS at 15:33

## 2024-09-27 RX ADMIN — PREDNISONE 10 MG: 10 TABLET ORAL at 09:07

## 2024-09-27 RX ADMIN — LEVALBUTEROL 1.25 MG: 1.25 SOLUTION, CONCENTRATE RESPIRATORY (INHALATION) at 13:51

## 2024-09-27 NOTE — PROGRESS NOTES
Drew Carrasco was evaluated today and a DME order was entered for a nebulizer compressor in order to administer  xopenex  due to the diagnosis of lung cancer and pneumonia.  The need for this equipment and treatment was discussed with the patient and he understands and is in agreement.

## 2024-09-27 NOTE — PROGRESS NOTES
Patients oxygen was delivered to the room but not currently to the patients house. Patient refuses to wait for the oxygen to be delivered to the house. Oxygen representative stated he could get to him within two hours, and the tank will last patient five hours. Patient aware of update and still want to be discharged prior to the delivery to the oxygen tank to his home. Nurse went over the AVS with patient/daughter at this time. Patient transported to [facility] via wheel chair and oxygen tank with all other belongings.

## 2024-09-27 NOTE — DISCHARGE INSTR - COC
Continuity of Care Form    Patient Name: Drew Carrasco   :  1950  MRN:  [id number]    Admit date:  2024  Discharge date:  ***    Code Status Order: Full Code   Advance Directives:   Advance Care Flowsheet Documentation             Admitting Physician:  No admitting provider for patient encounter.  PCP: Red Fabian MD    Discharging Nurse: ***  Discharging Hospital Unit/Room#: 3224/3224-01  Discharging Unit Phone Number: ***    Emergency Contact:   Extended Emergency Contact Information  Primary Emergency Contact: Nicholas Arias  [address] [place] [address] [place]  Home Phone: [phone]  Relation: Child  Secondary Emergency Contact: Caridad Diaz  Address: [address] [place] [address] [facility]  Home Phone: [phone]  Relation: Child    Past Surgical History:  Past Surgical History:   Procedure Laterality Date    BOWEL RESECTION      due to severe ulcers    BRONCHOSCOPY N/A 3/7/2024    BRONCHOSCOPY/TRANSBRONCHIAL NEEDLE BIOPSY ROBOTIC performed by Uma Bautista MD at [facility] ENDOSCOPY    BRONCHOSCOPY N/A 3/7/2024    BRONCHOSCOPY ENDOBRONCHIAL ULTRASOUND performed by Uma Bautista MD at [facility] ENDOSCOPY    BRONCHOSCOPY  3/7/2024    BRONCHOSCOPY/TRANSBRONCHIAL LUNG BIOPSY ROBOTIC performed by Uma Bautista MD at [facility] ENDOSCOPY    BRONCHOSCOPY N/A 3/7/2024    BRONCHOSCOPY/TRANSBRONCHIAL NEEDLE BIOPSY performed by Uma Bautista MD at [facility] ENDOSCOPY    BRONCHOSCOPY  3/7/2024    BRONCHOSCOPY ENDOBRONCHIAL ULTRASOUND FINE NEEDLE ASPIRATION performed by Uma Bautista MD at [facility] ENDOSCOPY       Immunization History:     There is no immunization history on file for this patient.    Active Problems:  Patient Active Problem List   Diagnosis Code    Acute on chronic respiratory failure with hypoxia J96.21    Radiation pneumonitis (HCC) J70.0    Small cell carcinoma of lung (HCC) C34.90    Urinary tract

## 2024-09-27 NOTE — PROGRESS NOTES
09/27/24 1345   Resting (On O2)   SpO2 96      O2 Device Nasal cannula   O2 Flow Rate (l/min) 2 l/min   During Walk (Room Air)   SpO2 81      Walk/Assistance Device Ambulation   Rate of Dyspnea 1   Symptoms Shortness of breath   During Walk (On O2)   SpO2 88      O2 Device Nasal cannula   O2 Flow Rate (l/min) 4 l/min   Need Additional O2 Flow Rate Rows Yes   Rate of Dyspnea 1   Symptoms Shortness of breath   After Walk   SpO2 92      O2 Device Nasal cannula   O2 Flow Rate (l/min) 2 l/min   Rate of Dyspnea 1   Does the Patient Qualify for Home O2 Yes   Liter Flow at Rest 2   Liter Flow on Exertion 4   Does the Patient Need Portable Oxygen Tanks Yes     Pt requires 2L at rest and 4L with exertion. Pt educated on O2 conservation techniques and use of home oxygen.

## 2024-09-27 NOTE — CARE COORDINATION
Spoke with Lokesh at [facility] [place] and O2 tank and nebulizer will be delivered before 1700 today.

## 2024-09-27 NOTE — CARE COORDINATION
Discharge Note     Discharge order received.      Destination: Home     Discharging to Facility/ Agency     Name: [facility]   Address: [address], [place] [address]   Phone:  [phone]     Transportation: Family     All case management needs met.        Comment: Pt d/c home in care of family. Family to transport. Spoke with Lokesh at [facility] and O2 tank and nebulizer will be delivered before 1700 today.

## 2024-09-27 NOTE — PLAN OF CARE
Problem: Discharge Planning  Goal: Discharge to home or other facility with appropriate resources  Outcome: Adequate for Discharge     Problem: Safety - Adult  Goal: Free from fall injury  Outcome: Adequate for Discharge     Problem: Respiratory - Adult  Goal: Achieves optimal ventilation and oxygenation  Outcome: Adequate for Discharge     Problem: Musculoskeletal - Adult  Goal: Return mobility to safest level of function  Outcome: Adequate for Discharge     Problem: Infection - Adult  Goal: Absence of infection at discharge  Outcome: Adequate for Discharge  Goal: Absence of infection during hospitalization  Outcome: Adequate for Discharge     Problem: Metabolic/Fluid and Electrolytes - Adult  Goal: Electrolytes maintained within normal limits  Outcome: Adequate for Discharge     Problem: Hematologic - Adult  Goal: Maintains hematologic stability  Outcome: Adequate for Discharge   Patient adequate for discharge.

## 2024-09-27 NOTE — DISCHARGE INSTRUCTIONS
Follow up with pcp in 1 week  Follow up with oncology as directed     Take amlodipine if systolic blood pressure (top number) is 110 or higher

## 2024-09-27 NOTE — PROGRESS NOTES
Hospitalist Progress Note      Name:  Drew Carrasco /Age/Sex: 1950  (74 y.o. male)   MRN & CSN:  [id number] & [id number] Encounter Date/Time: 2024 3:16 PM EDT    Location:  3224/3224-01 PCP: Red Fabian MD       Hospital Day: 3           Chief Complaint: Shortness of breath    Hospital Course: 74 y.o. male with a PMHx of HTN, small cell lung cancer, non-productive cough, radiation pneumonitis, and arrhythmia who presented for evaluation of worsening shortness of breath.  He has associated non-productive cough, slightly worse than baseline.  He additionally notes having a fever of 38 C at home earlier today (though at first he stated \"105 degrees\").  He had some associated nausea, chills and rigors overnight last night.  He reported some increased nocturia to his oncology team last week, which was attributed to BPH.  He otherwise denies any congestion, headache, vomiting, chest pain, abdominal pain, diarrhea, dysuria, or leg swelling.   Daughters at bedside explain that he qualified for home oxygen recently when he became hypoxic with ambulation.  Oxygen was unfortunately ordered to an incorrect address, which has now been corrected and is anticipated to arrive tomorrow.  Last chemotherapy session was on 24.  He is followed by [facility], Dr. Hardwick.        In the ER, pt mildly hypertensive, afebrile, HR 100s, O2 reported at 72% on room air, placed immediately on 5L via NC.  Labs remarkable for lactic 2.3, procalcitonin 0.67, ALT/AST mildly elevated, WBC 1.2.  Urinalysis was positive.  CXR was negative for acute findings.  CTPA pending.  Treated with IV cefepime, vancomycin, decadron, and albuterol nebs.       Subjective: Patient was evaluated bedside this morning.  His daughters and wife is present at bedside.  Patient does understand English but his daughter also acts as .  Pt states he feels better. States cough has been nonproductive.  Denies any chest congestion or head

## 2024-09-27 NOTE — PROGRESS NOTES
ONCOLOGY HEMATOLOGY CARE PROGRESS NOTE      SUBJECTIVE:    Feels better, breathing improved      ROS:     14 point review of systems performed negative except for as documented above      OBJECTIVE        Physical    VITALS:  Patient Vitals for the past 24 hrs:   BP Temp Temp src Pulse Resp SpO2   09/26/24 2022 (!) 142/98 98.1 °F (36.7 °C) Oral 99 18 100 %   09/26/24 1710 (!) 143/79 97.3 °F (36.3 °C) Oral (!) 106 18 100 %   09/26/24 1439 -- -- -- (!) 104 18 99 %   09/26/24 1313 (!) 156/83 97.3 °F (36.3 °C) Oral (!) 102 18 97 %   09/26/24 0830 (!) 146/71 97.3 °F (36.3 °C) Oral (!) 109 18 99 %   09/26/24 0824 -- -- -- 97 16 100 %   09/26/24 0319 (!) 160/91 97.2 °F (36.2 °C) Oral 100 18 94 %   09/25/24 2303 132/77 98.4 °F (36.9 °C) Oral 99 20 99 %   09/25/24 2126 -- -- -- 98 18 100 %   09/25/24 2125 -- -- -- 98 18 100 %       24HR INTAKE/OUTPUT:    Intake/Output Summary (Last 24 hours) at 9/26/2024 2057  Last data filed at 9/26/2024 2022  Gross per 24 hour   Intake 480 ml   Output 1800 ml   Net -1320 ml       CONSTITUTIONAL: awake, alert, cooperative, no apparent distress   LUNGS: no increased work of breathing and clear to auscultation   CARDIOVASCULAR: regular rate and rhythm, normal S1 and S2, no murmur noted  ABDOMEN: normal bowel sounds x 4, soft, non-distended, non-tender, no masses palpated, no hepatosplenomgaly   EXTREMITIES: no LE edema     DATA:  CBC:    Recent Labs     09/26/24  0555 09/25/24  0554 09/24/24  1911   WBC 1.0* 0.5* 1.2*   NEUTROABS 0.69* 0.30* 0.53*   LYMPHOPCT 21 26 44   RBC 3.31* 3.66* 3.89*   HGB 10.0* 11.2* 11.7*   HCT 29.4* 32.7* 34.5*   MCV 88.8 89.3 88.7   MCH 30.2 30.6 30.1   MCHC 34.0 34.3 33.9   RDW 14.1 13.9 14.0   PLT 34* 38* 60*       PT/INR:    Recent Labs     09/25/24  0554 09/24/24 1911   INR 1.0 1.0     PTT:  No results for input(s): \"APTT\" in the last 720 hours.    CMP:    Recent Labs     09/26/24  0555 09/25/24  0554 09/24/24 1911   NA

## 2024-09-27 NOTE — DISCHARGE SUMMARY
Hospital Medicine Discharge Summary    Patient ID: Drew Gajic      Patient's PCP: Red Fabian MD    Admit Date: 9/24/2024     Discharge Date: 9/27/2024      Admitting Physician: No admitting provider for patient encounter.     Discharge Physician: DREA Gates - CNP     Discharge Diagnoses  Acute respiratory failure with hypoxia  Small cell lung cancer  Left lower lobe pneumonia  Neutropenia  Acute cystitis without hematuria  History of radiation pneumonitis  History of hypertension      Hospital Course: 74 y.o. male with a PMHx of HTN, small cell lung cancer, non-productive cough, radiation pneumonitis, and arrhythmia who presented for evaluation of worsening shortness of breath. He has associated non-productive cough, slightly worse than baseline. He additionally notes having a fever of 38 C at home earlier today (though at first he stated \"105 degrees\"). He had some associated nausea, chills and rigors.  Patient admitted with acute hypoxic respiratory failure pneumonia.  See details below.      Acute respiratory failure with hypoxia  Small cell lung cancer  -Home oxygen study performed, patient qualifies for 2 L nasal cannula at rest and 4 L nasal cannula with ambulation.  Oxygen order was written and patient will be set up for home O2, social work assisting.  -Nebulizer machine with Xopenex prescription provided   -Mucinex twice daily  -continue with home dose prednisone per daughter recently decreased to 10 mg daily on September 1  -Pulmonology consulted appreciate recommendations        LLL Pneumonia  Neutropenic fever  -pulmonologist consulted  -sputum culture pending at time of discharge, however infectious disease give antibiotic recommendations of Levaquin.  -Treated with zosyn and levaquin  -Symptomatically patient's pneumonia improved  -Blood cultures no growth to date  -Urine for Legionella and strep pneumonia negative  -MRSA PCR negative  -Pneumonia panel  negative    Neutropenia: WBC 1.2 on admission  -Neutropenic cautions  -Heme-onc consulted-appreciate assistance  -White blood cell count 1.3 at discharge     Acute cystitis without hematuria: UA demonstrated positive nitrates, WBCs present, 3 + bacteria  -Urine cultures no growth     History of radiation pneumonitis  -Continue steroids     History of hypertension not on outpatient regimen.    Patient stated they felt well and wanted to go home.  Patient denied chest pain, shortness of breath, palpitations, abdominal pain, nausea vomiting, diarrhea, dysuria, headache lightheadedness or dizziness.  Appetite good.  Voiding without difficulty.  Reviewed plan of care with patient, verbalized understanding and agreement.  Denied further questions or needs.      Physical Exam Performed:     /85   Pulse (!) 103   Temp 98.1 °F (36.7 °C) (Oral)   Resp 20   Ht 1.702 m (5' 7\")   Wt 84.8 kg (187 lb)   SpO2 99%   BMI 29.29 kg/m²       General appearance:  No apparent distress, appears stated age and cooperative.  HEENT:  Normal cephalic, atraumatic without obvious deformity. Pupils equal, round.  Extra ocular muscles intact. Conjunctivae/corneas clear.  Neck: Supple, with full range of motion. No jugular venous distention. Trachea midline.  Respiratory:  Normal respiratory effort. Clear to auscultation, bilaterally without Rales/Wheezes/Rhonchi.  Cardiovascular:  Regular rate and rhythm with normal S1/S2 without murmurs, rubs or gallops.  Abdomen: Soft, non-tender, non-distended with normal bowel sounds.  Musculoskeletal:  No clubbing, cyanosis or edema bilaterally.  Full range of motion without deformity.  Skin: Skin color, texture, turgor normal.  No rashes or lesions.  Neurologic:  Face symmetrical, speech clear, moves all 4 extremities, sensations are intact bilaterally   Psychiatric:  Alert and oriented, thought content appropriate, normal insight  Capillary Refill: Brisk,< 3 seconds   Peripheral Pulses: +2

## 2024-09-27 NOTE — PLAN OF CARE
Problem: Discharge Planning  Goal: Discharge to home or other facility with appropriate resources  Outcome: Progressing  Flowsheets (Taken 9/25/2024 0038)  Discharge to home or other facility with appropriate resources: Identify barriers to discharge with patient and caregiver     Problem: Safety - Adult  Goal: Free from fall injury  9/26/2024 2317 by Chery Galaviz RN  Outcome: Progressing  Flowsheets (Taken 9/25/2024 0038)  Free From Fall Injury: Instruct family/caregiver on patient safety  9/26/2024 1719 by Kelly Retana RN  Outcome: Progressing     Problem: Respiratory - Adult  Goal: Achieves optimal ventilation and oxygenation  9/26/2024 2317 by Chery Galaviz RN  Outcome: Progressing  Flowsheets (Taken 9/25/2024 0038)  Achieves optimal ventilation and oxygenation:   Assess for changes in respiratory status   Position to facilitate oxygenation and minimize respiratory effort   Assess for changes in mentation and behavior   Oxygen supplementation based on oxygen saturation or arterial blood gases  9/26/2024 1719 by Kelly Retana RN  Outcome: Progressing     Problem: Musculoskeletal - Adult  Goal: Return mobility to safest level of function  9/26/2024 2317 by Chery Galaviz RN  Outcome: Progressing  Flowsheets (Taken 9/25/2024 0038)  Return Mobility to Safest Level of Function: Assess patient stability and activity tolerance for standing, transferring and ambulating with or without assistive devices  9/26/2024 1719 by Kelly Retana, RN  Outcome: Progressing     Problem: Infection - Adult  Goal: Absence of infection at discharge  Outcome: Progressing  Flowsheets (Taken 9/25/2024 0038)  Absence of infection at discharge:   Assess and monitor for signs and symptoms of infection   Monitor lab/diagnostic results

## 2024-09-27 NOTE — RT PROTOCOL NOTE
RT Nebulizer Bronchodilator Protocol Note    There is a bronchodilator order in the chart from a provider indicating to follow the RT Bronchodilator Protocol and there is an “Initiate RT Bronchodilator Protocol” order as well (see protocol at bottom of note).    CXR Findings:  No results found.    The findings from the last RT Protocol Assessment were as follows:  Smoking: Chronic pulmonary disease  Respiratory Pattern: Regular pattern and RR 12-20 bpm  Breath Sounds: Slightly diminished and/or crackles  Cough: Strong, spontaneous, non-productive  Indication for Bronchodilator Therapy: Decreased or absent breath sounds  Bronchodilator Assessment Score: 4    Aerosolized bronchodilator medication orders have been revised according to the RT Nebulizer Bronchodilator Protocol below.    Respiratory Therapist to perform RT Therapy Protocol Assessment initially then follow the protocol.  Repeat RT Therapy Protocol Assessment PRN for score 0-3 or on second treatment, BID, and PRN for scores above 3.    No Indications - adjust the frequency to every 6 hours PRN wheezing or bronchospasm, if no treatments needed after 48 hours then discontinue using Per Protocol order mode.     If indication present, adjust the RT bronchodilator orders based on the Bronchodilator Assessment Score as indicated below.  If a patient is on this medication at home then do not decrease Frequency below that used at home.    0-3 - enter or revise RT bronchodilator order(s) to equivalent RT Bronchodilator order with Frequency of every 4 hours PRN for wheezing or increased work of breathing using Per Protocol order mode.       4-6 - enter or revise RT Bronchodilator order(s) to two equivalent RT bronchodilator orders with one order with BID Frequency and one order with Frequency of every 4 hours PRN wheezing or increased work of breathing using Per Protocol order mode.         7-10 - enter or revise RT Bronchodilator order(s) to two equivalent RT 
RT Nebulizer Bronchodilator Protocol Note    There is a bronchodilator order in the chart from a provider indicating to follow the RT Bronchodilator Protocol and there is an “Initiate RT Bronchodilator Protocol” order as well (see protocol at bottom of note).    CXR Findings:  XR CHEST PORTABLE    Result Date: 9/24/2024  Stable treatment-related changes. No acute consolidation. Electronically signed by Tex Issa      The findings from the last RT Protocol Assessment were as follows:  Smoking: Chronic pulmonary disease  Respiratory Pattern: Regular pattern and RR 12-20 bpm  Breath Sounds: Slightly diminished and/or crackles  Cough: Strong, spontaneous, non-productive  Indication for Bronchodilator Therapy: Decreased or absent breath sounds  Bronchodilator Assessment Score: 4    Aerosolized bronchodilator medication orders have been revised according to the RT Nebulizer Bronchodilator Protocol below.    Respiratory Therapist to perform RT Therapy Protocol Assessment initially then follow the protocol.  Repeat RT Therapy Protocol Assessment PRN for score 0-3 or on second treatment, BID, and PRN for scores above 3.    No Indications - adjust the frequency to every 6 hours PRN wheezing or bronchospasm, if no treatments needed after 48 hours then discontinue using Per Protocol order mode.     If indication present, adjust the RT bronchodilator orders based on the Bronchodilator Assessment Score as indicated below.  If a patient is on this medication at home then do not decrease Frequency below that used at home.    0-3 - enter or revise RT bronchodilator order(s) to equivalent RT Bronchodilator order with Frequency of every 4 hours PRN for wheezing or increased work of breathing using Per Protocol order mode.       4-6 - enter or revise RT Bronchodilator order(s) to two equivalent RT bronchodilator orders with one order with BID Frequency and one order with Frequency of every 4 hours PRN wheezing or increased work of 
RT Nebulizer Bronchodilator Protocol Note    There is a bronchodilator order in the chart from a provider indicating to follow the RT Bronchodilator Protocol and there is an “Initiate RT Bronchodilator Protocol” order as well (see protocol at bottom of note).    CXR Findings:  XR CHEST PORTABLE    Result Date: 9/24/2024  Stable treatment-related changes. No acute consolidation. Electronically signed by Tex Issa      The findings from the last RT Protocol Assessment were as follows:  Smoking: Chronic pulmonary disease  Respiratory Pattern: Use of accessory muscles, prolonged exhalation, or RR 26-30 bpm  Breath Sounds: Slightly diminished and/or crackles  Cough: Strong, spontaneous, non-productive  Indication for Bronchodilator Therapy: Decreased or absent breath sounds  Bronchodilator Assessment Score: 10    Aerosolized bronchodilator medication orders have been revised according to the RT Nebulizer Bronchodilator Protocol below.    Respiratory Therapist to perform RT Therapy Protocol Assessment initially then follow the protocol.  Repeat RT Therapy Protocol Assessment PRN for score 0-3 or on second treatment, BID, and PRN for scores above 3.    No Indications - adjust the frequency to every 6 hours PRN wheezing or bronchospasm, if no treatments needed after 48 hours then discontinue using Per Protocol order mode.     If indication present, adjust the RT bronchodilator orders based on the Bronchodilator Assessment Score as indicated below.  If a patient is on this medication at home then do not decrease Frequency below that used at home.    0-3 - enter or revise RT bronchodilator order(s) to equivalent RT Bronchodilator order with Frequency of every 4 hours PRN for wheezing or increased work of breathing using Per Protocol order mode.       4-6 - enter or revise RT Bronchodilator order(s) to two equivalent RT bronchodilator orders with one order with BID Frequency and one order with Frequency of every 4 hours PRN 
bronchodilator orders with one order with TID Frequency and one order with Frequency of every 4 hours PRN wheezing or increased work of breathing using Per Protocol order mode.       11-13 - enter or revise RT Bronchodilator order(s) to one equivalent RT bronchodilator order with QID Frequency and an Albuterol order with Frequency of every 4 hours PRN wheezing or increased work of breathing using Per Protocol order mode.      Greater than 13 - enter or revise RT Bronchodilator order(s) to one equivalent RT bronchodilator order with every 4 hours Frequency and an Albuterol order with Frequency of every 2 hours PRN wheezing or increased work of breathing using Per Protocol order mode.     RT to enter RT Home Evaluation for COPD & MDI Assessment order using Per Protocol order mode.    Electronically signed by Isabel Horn RCP on 9/27/2024 at 7:59 AM

## 2024-09-27 NOTE — PROGRESS NOTES
Pulmonary & Critical Care Medicine    Admit Date: 2024  PCP: Red Fabian MD    CC:  pneumonia   Events of Last 24 hours:   No acute events over the last 24 hours. Drew is ready to go home. Do not suspect that his oxygen requirements will decrease    Vitals:  Tmax:  VITALS:  /84   Pulse (!) 101   Temp 97.7 °F (36.5 °C) (Oral)   Resp 20   Ht 1.702 m (5' 7\")   Wt 84.8 kg (187 lb)   SpO2 100%   BMI 29.29 kg/m²   24HR INTAKE/OUTPUT:    Intake/Output Summary (Last 24 hours) at 2024 1353  Last data filed at 2024 2319  Gross per 24 hour   Intake 240 ml   Output 1100 ml   Net -860 ml     CURRENT PULSE OXIMETRY:  SpO2: 100 %  24HR PULSE OXIMETRY RANGE:  SpO2  Av.7 %  Min: 94 %  Max: 100 %    EXAM:  General: No distress. Alert.  Eyes: PERRL. No sclera icterus. No conjunctival injection.  ENT: No discharge. Moist oral mucosa   Neck: Trachea midline. Neck is supple   Resp: No accessory muscle use. Scattered rales.  No wheezing.   CV: Regular rate. Regular rhythm. No mumur or rub. No edema.   GI: Non-tender. Non-distended.  Normal bowel sounds.   Neuro: Awake. Speech is clear.    Psych: No anxiety or agitation.     Medications:    IV:   sodium chloride           Scheduled Meds:   filgrastim/filgrastim biosimilar  300 mcg SubCUTAneous Daily    polyethylene glycol  17 g Oral BID    levofloxacin  750 mg IntraVENous Q24H    predniSONE  10 mg Oral Daily    levalbuterol  1.25 mg Nebulization Q6H WA RT    guaiFENesin  600 mg Oral BID    piperacillin-tazobactam  3,375 mg IntraVENous q8h         Diet: ADULT DIET; Regular     Results:  CBC:   Recent Labs     24  0554 24  0555 24  0528   WBC 0.5* 1.0* 1.3*   HGB 11.2* 10.0* 10.5*   HCT 32.7* 29.4* 31.5*   MCV 89.3 88.8 90.3   PLT 38* 34* 31*     BMP:   Recent Labs     24  1911 24  0554 24  0555 24  0528   * 137 137 139   K 4.0 4.6 4.4 4.2    107 108 106   CO2 21 20* 21 23   PHOS 2.0*  --    --   --    BUN 15 13 15 15   CREATININE 1.0 0.9 0.8 1.0     LIVER PROFILE:   Recent Labs     09/24/24 1911 09/25/24  0554   AST 63* 64*   ALT 72* 80*   BILIDIR  --  <0.2   BILITOT 0.9 0.3   ALKPHOS 71 69     PT/INR:   Recent Labs     09/24/24 1911 09/25/24  0554   PROTIME 12.8 13.4   INR 1.0 1.0     APTT: No results for input(s): \"APTT\" in the last 72 hours.  UA:  Recent Labs     09/24/24 1911   COLORU Yellow   PHUR 6.0   WBCUA 6 TO 9*   RBCUA 0 TO 2   BACTERIA 3+*   LEUKOCYTESUR SMALL*   UROBILINOGEN 0.2   BILIRUBINUR NEGATIVE   GLUCOSEU NEGATIVE         Assessment/Plan:  74 y.o. male with     Metastatic small cell lung cancer   Pneumonia.  New infiltrate in LLL  Neutropenic fever   Radiation pneumonitis on steroids taper     Antibiotics per ID  Stable to discharge from a pulm standpoint after home O2 evaluation  We will sign off at this time      Ean Chopra, APRN - CNP

## 2024-09-27 NOTE — PROGRESS NOTES
otherwise unchanged. There  are small bilateral pleural effusions. There is no pneumothorax.    The heart size is normal. There is no pericardial effusion. The trachea is  patent. No supraclavicular, axillary, hilar or mediastinal lymphadenopathy is  visible.    The osseous thorax is intact.    Limited noncontrast evaluation of the upper abdomen is unremarkable.  Impression: 1.  No evidence of pulmonary embolism.  2.  New very small bilateral pleural effusions.  3.  Stable bilateral perihilar post radiation changes with the exception of new  mild groundglass opacities posteriorly in the left lower lobe which could  represent superimposed pneumonia versus progressed radiation fibrosis compared  to 8/26/2024.    Electronically signed by Tex Issa  XR CHEST PORTABLE  Narrative: EXAM: PORTABLE AP CHEST X-RAY    INDICATION: sob fever h/o lung ca,    COMPARISON: 8/26/2024    FINDINGS:    Bilateral perihilar postradiation changes and left upper lobe malignancy appear  stable. There is no acute consolidation, pleural effusion, or pneumothorax. The  cardiomediastinal silhouette is normal. The visible bony thorax is intact.  Impression: Stable treatment-related changes. No acute consolidation.    Electronically signed by Tex Issa      Problem List  Patient Active Problem List   Diagnosis    Acute on chronic respiratory failure with hypoxia    Radiation pneumonitis (HCC)    Small cell carcinoma of lung (HCC)    Urinary tract infection without hematuria    Neutropenic fever (HCC)    Abnormal CT of the chest    Lactic acid acidosis    LFT elevation    On prednisone therapy    On supplemental oxygen by nasal cannula    Acute respiratory failure with hypoxia       ASSESSMENT AND PLAN:    Metastatic Small Cell Lung Cancer   -At least stage IIIc small cell lung cancer, T3, N3, disease on presentation -Large left upper lobe mass with satellite nodules as well as occlusion of subsegmental bronchus, bilateral hilar and mediastinal

## 2024-09-29 LAB
MICROORGANISM SPEC CULT: ABNORMAL
MICROORGANISM SPEC CULT: ABNORMAL
MICROORGANISM SPEC CULT: NORMAL
MICROORGANISM/AGENT SPEC: ABNORMAL
MICROORGANISM/AGENT SPEC: ABNORMAL
SERVICE CMNT-IMP: ABNORMAL
SERVICE CMNT-IMP: NORMAL
SPECIMEN DESCRIPTION: ABNORMAL
SPECIMEN DESCRIPTION: NORMAL

## 2024-09-30 NOTE — PROGRESS NOTES
to Clinical Documentation Reviewer    PROVIDER RESPONSE TEXT:    This patient has sepsis present on arrival due to pneumonia bacterial   pneumonia, unknown type    Query created by: Talia Bass on 9/27/2024 1:37 PM      Electronically signed by:  ASHTYN DOAN 9/30/2024 2:48 PM

## 2024-10-01 ENCOUNTER — TELEPHONE (OUTPATIENT)
Dept: INFECTIOUS DISEASES | Age: 74
End: 2024-10-01

## 2024-10-01 DIAGNOSIS — B59 PNEUMONIA OF BOTH LOWER LOBES DUE TO PNEUMOCYSTIS JIROVECII (HCC): Primary | ICD-10-CM

## 2024-10-01 LAB
P JIROVECII DNA SPEC QL NAA+PROBE: DETECTED
SPECIMEN SOURCE: NORMAL

## 2024-10-01 RX ORDER — SULFAMETHOXAZOLE AND TRIMETHOPRIM 400; 80 MG/1; MG/1
1 TABLET ORAL DAILY
Qty: 30 TABLET | Refills: 3 | Status: SHIPPED | OUTPATIENT
Start: 2024-10-01 | End: 2025-01-29

## 2024-10-01 NOTE — TELEPHONE ENCOUNTER
Sputum for PJP pcr +Ve   This was done as in patient   He is on chronic steroids and hypoxic hence sent this out  Reported +Ve now     Will start oral Bactrim SS once a day meds sent to pharmacy    I left message to his daughter on the phone to call back as the pts Phone not working - thx     Let them know - if they call back  - thx

## 2024-10-07 ENCOUNTER — TELEPHONE (OUTPATIENT)
Dept: PULMONOLOGY | Age: 74
End: 2024-10-07

## 2024-10-18 ENCOUNTER — OFFICE VISIT (OUTPATIENT)
Dept: PULMONOLOGY | Age: 74
End: 2024-10-18
Payer: MEDICARE

## 2024-10-18 VITALS
SYSTOLIC BLOOD PRESSURE: 136 MMHG | HEART RATE: 110 BPM | WEIGHT: 181 LBS | OXYGEN SATURATION: 96 % | HEIGHT: 67 IN | BODY MASS INDEX: 28.41 KG/M2 | DIASTOLIC BLOOD PRESSURE: 90 MMHG

## 2024-10-18 DIAGNOSIS — J70.0 RADIATION PNEUMONITIS (HCC): Primary | ICD-10-CM

## 2024-10-18 PROCEDURE — 1036F TOBACCO NON-USER: CPT | Performed by: INTERNAL MEDICINE

## 2024-10-18 PROCEDURE — G8427 DOCREV CUR MEDS BY ELIG CLIN: HCPCS | Performed by: INTERNAL MEDICINE

## 2024-10-18 PROCEDURE — 99215 OFFICE O/P EST HI 40 MIN: CPT | Performed by: INTERNAL MEDICINE

## 2024-10-18 PROCEDURE — G8484 FLU IMMUNIZE NO ADMIN: HCPCS | Performed by: INTERNAL MEDICINE

## 2024-10-18 PROCEDURE — 1111F DSCHRG MED/CURRENT MED MERGE: CPT | Performed by: INTERNAL MEDICINE

## 2024-10-18 PROCEDURE — 3017F COLORECTAL CA SCREEN DOC REV: CPT | Performed by: INTERNAL MEDICINE

## 2024-10-18 PROCEDURE — 1123F ACP DISCUSS/DSCN MKR DOCD: CPT | Performed by: INTERNAL MEDICINE

## 2024-10-18 PROCEDURE — G8419 CALC BMI OUT NRM PARAM NOF/U: HCPCS | Performed by: INTERNAL MEDICINE

## 2024-10-18 RX ORDER — PREDNISONE 10 MG/1
TABLET ORAL
Qty: 90 TABLET | Refills: 3 | Status: SHIPPED | OUTPATIENT
Start: 2024-10-18

## 2024-10-18 NOTE — PROGRESS NOTES
PULMONARY OFFICE FOLLOW UP NOTE    REASON FOR VISIT:   Chief Complaint   Patient presents with    Shortness of Breath     While sitting and lying down he is ok. SOB with any exertion with excessive fatigue. After 2 rounds of chemo, his entire immune system was wiped out. Developed Pneumonia. Cannot do chemo right now so Dr Ames referred. Using o2 2 L per min but goes up to 4 when needed. Stated the o2 doesn't seem to help.    Small Cell Lung Cancer       DATE OF VISIT: 10/18/2024    HISTORY OF PRESENT ILLNESS: 74 y.o. year old male former smoker is here for follow-up of hypoxic respiratory failure.    Patient was noted to have left upper lobe mass along with right hilar lymphadenopathy in March 2024 for which he underwent bronchoscopy on 3/7/2024 and results were positive for small cell lung cancer at least stage IIIc.  He was initiated on on carboplatin/etoposide and Tecentriq which he finished on 5/23/2024.  Radiation was started on 5/24/2024 with maintenance Tecentriq.  Noted to have liver mets and he was switched to lurbinectedin on 9/17/2024    Patient developed radiation pneumonitis in July and has been on steroids since August 2024.  He was started on prednisone 20 mg daily which he took for 2 weeks and then taper to 10 mg daily and now he is on 5 mg daily.    Recently he was hospitalized for worsening shortness of breath and hypoxia.  CT chest from 9/24/2024 continues to show bilateral perihilar fibrosis consistent with radiation pneumonitis along with new left lower lobe infiltrate.  He was treated for presumed pneumonia and discharged on oxygen.  Currently using oxygen at 2-4 L/min    Patient has previous significant dyspnea on exertion as well as cough.  He desaturates quickly with exertion.  He desaturated to 79% upon walking on room air.      REVIEW OF SYSTEMS:   CONSTITUTIONAL SYMPTOMS: The patient denies fever, fatigue, night sweats, weight loss or weight gain.   HEENT: No vision changes. No

## 2024-11-28 LAB — L PNEUMO1 AG UR QL IA.RAPID: NEGATIVE

## 2024-12-02 ENCOUNTER — HOSPITAL ENCOUNTER (OUTPATIENT)
Age: 74
Discharge: HOME | End: 2024-12-02
Payer: MEDICARE

## 2024-12-02 DIAGNOSIS — C34.12 SMALL CELL LUNG CANCER, LEFT UPPER LOBE (HCC): ICD-10-CM

## 2024-12-02 PROCEDURE — 6360000004 HC RX CONTRAST MEDICATION: Performed by: NURSE PRACTITIONER

## 2024-12-02 PROCEDURE — 71260 CT THORAX DX C+: CPT

## 2024-12-02 RX ORDER — DIATRIZOATE MEGLUMINE AND DIATRIZOATE SODIUM 660; 100 MG/ML; MG/ML
30 SOLUTION ORAL; RECTAL
Status: DISCONTINUED | OUTPATIENT
Start: 2024-12-02 | End: 2024-12-03 | Stop reason: HOSPADM

## 2024-12-02 RX ORDER — IOPAMIDOL 755 MG/ML
75 INJECTION, SOLUTION INTRAVASCULAR
Status: COMPLETED | OUTPATIENT
Start: 2024-12-02 | End: 2024-12-02

## 2024-12-02 RX ADMIN — DIATRIZOATE MEGLUMINE AND DIATRIZOATE SODIUM 30 ML: 660; 100 LIQUID ORAL; RECTAL at 07:49

## 2024-12-02 RX ADMIN — IOPAMIDOL 75 ML: 755 INJECTION, SOLUTION INTRAVENOUS at 07:49

## 2024-12-17 ENCOUNTER — HOSPITAL ENCOUNTER (OUTPATIENT)
Age: 74
Setting detail: OBSERVATION
Discharge: HOME | End: 2024-12-18
Attending: INTERNAL MEDICINE | Admitting: INTERNAL MEDICINE
Payer: MEDICARE

## 2024-12-17 PROBLEM — C34.91 SMALL CELL CARCINOMA OF LUNG, RIGHT (HCC): Status: ACTIVE | Noted: 2024-12-17

## 2024-12-17 PROCEDURE — 6370000000 HC RX 637 (ALT 250 FOR IP): Performed by: NURSE PRACTITIONER

## 2024-12-17 PROCEDURE — 2500000003 HC RX 250 WO HCPCS: Performed by: NURSE PRACTITIONER

## 2024-12-17 PROCEDURE — G0379 DIRECT REFER HOSPITAL OBSERV: HCPCS

## 2024-12-17 PROCEDURE — 2580000003 HC RX 258: Performed by: NURSE PRACTITIONER

## 2024-12-17 PROCEDURE — G0378 HOSPITAL OBSERVATION PER HR: HCPCS

## 2024-12-17 RX ORDER — POLYETHYLENE GLYCOL 3350 17 G/17G
17 POWDER, FOR SOLUTION ORAL DAILY PRN
Status: DISCONTINUED | OUTPATIENT
Start: 2024-12-17 | End: 2024-12-18 | Stop reason: HOSPADM

## 2024-12-17 RX ORDER — SODIUM CHLORIDE 9 MG/ML
INJECTION, SOLUTION INTRAVENOUS PRN
Status: DISCONTINUED | OUTPATIENT
Start: 2024-12-17 | End: 2024-12-18 | Stop reason: HOSPADM

## 2024-12-17 RX ORDER — ACETAMINOPHEN 650 MG/1
650 SUPPOSITORY RECTAL EVERY 6 HOURS PRN
Status: DISCONTINUED | OUTPATIENT
Start: 2024-12-17 | End: 2024-12-18 | Stop reason: HOSPADM

## 2024-12-17 RX ORDER — SODIUM CHLORIDE 0.9 % (FLUSH) 0.9 %
5-40 SYRINGE (ML) INJECTION EVERY 12 HOURS SCHEDULED
Status: DISCONTINUED | OUTPATIENT
Start: 2024-12-17 | End: 2024-12-18 | Stop reason: HOSPADM

## 2024-12-17 RX ORDER — ALBUTEROL SULFATE 0.83 MG/ML
2.5 SOLUTION RESPIRATORY (INHALATION) EVERY 4 HOURS PRN
Status: DISCONTINUED | OUTPATIENT
Start: 2024-12-17 | End: 2024-12-18 | Stop reason: HOSPADM

## 2024-12-17 RX ORDER — PANTOPRAZOLE SODIUM 40 MG/1
40 TABLET, DELAYED RELEASE ORAL
Status: DISCONTINUED | OUTPATIENT
Start: 2024-12-18 | End: 2024-12-18 | Stop reason: HOSPADM

## 2024-12-17 RX ORDER — ALBUTEROL SULFATE 90 UG/1
1 INHALANT RESPIRATORY (INHALATION) EVERY 4 HOURS PRN
Status: DISCONTINUED | OUTPATIENT
Start: 2024-12-17 | End: 2024-12-17

## 2024-12-17 RX ORDER — ACETAMINOPHEN 325 MG/1
650 TABLET ORAL EVERY 6 HOURS PRN
Status: DISCONTINUED | OUTPATIENT
Start: 2024-12-17 | End: 2024-12-18 | Stop reason: HOSPADM

## 2024-12-17 RX ORDER — SODIUM CHLORIDE 0.9 % (FLUSH) 0.9 %
5-40 SYRINGE (ML) INJECTION PRN
Status: DISCONTINUED | OUTPATIENT
Start: 2024-12-17 | End: 2024-12-18 | Stop reason: HOSPADM

## 2024-12-17 RX ORDER — AMLODIPINE BESYLATE 5 MG/1
5 TABLET ORAL DAILY
Status: DISCONTINUED | OUTPATIENT
Start: 2024-12-17 | End: 2024-12-18 | Stop reason: HOSPADM

## 2024-12-17 RX ORDER — TAMSULOSIN HYDROCHLORIDE 0.4 MG/1
0.4 CAPSULE ORAL DAILY
Status: DISCONTINUED | OUTPATIENT
Start: 2024-12-17 | End: 2024-12-18 | Stop reason: HOSPADM

## 2024-12-17 RX ORDER — SUCRALFATE 1 G/1
1 TABLET ORAL EVERY 6 HOURS PRN
Status: DISCONTINUED | OUTPATIENT
Start: 2024-12-17 | End: 2024-12-18 | Stop reason: HOSPADM

## 2024-12-17 RX ORDER — SODIUM CHLORIDE 9 MG/ML
INJECTION, SOLUTION INTRAVENOUS CONTINUOUS
Status: ACTIVE | OUTPATIENT
Start: 2024-12-17 | End: 2024-12-17

## 2024-12-17 RX ORDER — PREDNISONE 5 MG/1
5 TABLET ORAL DAILY
Status: DISCONTINUED | OUTPATIENT
Start: 2024-12-17 | End: 2024-12-17

## 2024-12-17 RX ADMIN — SODIUM CHLORIDE, PRESERVATIVE FREE 10 ML: 5 INJECTION INTRAVENOUS at 20:42

## 2024-12-17 RX ADMIN — PREDNISONE 15 MG: 5 TABLET ORAL at 15:52

## 2024-12-17 RX ADMIN — SODIUM CHLORIDE: 9 INJECTION, SOLUTION INTRAVENOUS at 15:18

## 2024-12-17 NOTE — CARE COORDINATION
Case Management Assessment  Initial Evaluation    Date/Time of Evaluation: 12/17/2024 2:58 PM  Assessment Completed by: JULISA Long   for [facility] ([facility])  [employer] Mobile: [phone]    If patient is discharged prior to next notation, then this note serves as note for discharge by case management.    Patient Name: Drew Carrasco                   YOB: 1950  Diagnosis: Small cell carcinoma of lung, right (HCC) [C34.91]                   Date / Time: 12/17/2024  1:56 PM    Patient Admission Status: Observation   Readmission Risk (Low < 19, Mod (19-27), High > 27): Readmission Risk Score: 14    Current PCP: Red Fabian MD  PCP verified by CM? Yes    Chart Reviewed: Yes      History Provided by: Patient  Patient Orientation: Alert and Oriented    Patient Cognition: Alert    Hospitalization in the last 30 days (Readmission):  No    If yes, Readmission Assessment in CM Navigator will be completed.    Advance Directives:      Code Status: Full Code   Patient's Primary Decision Maker is:  (Pt's daughter Caridad states that she will bring healthcare POA for staff to copy but it is on her phone if needed to be reviewed prior. She said that her and her sister Nicholas are healthcare POA. Soraida YAÑEZ aware dt said will bring POA for staff to copy)      Discharge Planning:    Patient lives with: Spouse/Significant Other Type of Home: House  Primary Care Giver: Self  Patient Support Systems include: Children, Family Members   Current Financial resources: Medicare, Other (Comment) ([employer] secondary)  Current community resources: None  Current services prior to admission: Durable Medical Equipment, Other (Comment) ([facility])            Current DME: Oxygen Therapy (Comment) ([employer] at 2 liters and Nebulizer)            Type of Home Care services:  None    ADLS  Prior functional level: Independent in ADLs/IADLs  Current functional level:

## 2024-12-17 NOTE — PROGRESS NOTES
Patient admitted from [facility] after Emdeltra dose. He is alert and oriented x4 with stable vitals, his daughter and wife brought him to his room. His skin is intact, he is on room air and his oxygen saturation is WDL but he reports using 2L oxygen via nasal cannula at home. He is independent and his orthostatic blood pressure readings are stable, no fall alarms needed. He has glasses and dentures. He is agreeable to admission.    Prescription signed.  Radha Saenz MD

## 2024-12-17 NOTE — PROGRESS NOTES
4 Eyes Admission Assessment     I agree as the admission nurse that 2 RN's have performed a thorough Head to Toe Skin Assessment on the patient. ALL assessment sites listed below have been assessed on admission.       Areas assessed by both nurses: Christine  [x]   Head, Face, and Ears   [x]   Shoulders, Back, and Chest  [x]   Arms, Elbows, and Hands   [x]   Coccyx, Sacrum, and Ischium  [x]   Legs, Feet, and Heels        Does the Patient have Skin Breakdown?  No         Ori Prevention initiated:  NA   Wound Care Orders initiated:  NA      [facility] nurse consulted for Pressure Injury (Stage 3,4, Unstageable, DTI, NWPT, and Complex wounds) or Ori score 18 or lower:  No      Nurse 1 eSignature: Electronically signed by Ellen Manning RN on 12/17/24 at 2:58 PM EST    **SHARE this note so that the co-signing nurse is able to place an eSignature**    Nurse 2 eSignature: Electronically signed by Soraida Piña RN on 12/17/24 at 4:05 PM EST

## 2024-12-17 NOTE — PLAN OF CARE
Problem: Skin/Tissue Integrity  Goal: Absence of new skin breakdown  Description: 1.  Monitor for areas of redness and/or skin breakdown  2.  Assess vascular access sites hourly  3.  Every 4-6 hours minimum:  Change oxygen saturation probe site  4.  Every 4-6 hours:  If on nasal continuous positive airway pressure, respiratory therapy assess nares and determine need for appliance change or resting period.  Outcome: Progressing   Skin intact and nursing will continue to monitor and assess skin.    Problem: Infection - Adult  Goal: Absence of infection at discharge  Outcome: Progressing   Pt remains in neutropenic precautions for WBC of 0.8. Pt educated on use of mask prior to leaving room. All staff and visitor following handwashing requirements this shift prior to entering room. Low microbial diet order in place and being followed. Linens changed today per protocol and pt reports using chlorhexidine wash per order.   Problem: Hematologic - Adult  Goal: Maintains hematologic stability  Outcome: Progressing   Patient's hemoglobin this AM: No results for input(s): \"HGB\" in the last 72 hours.  Patient's platelet count this AM: No results for input(s): \"PLT\" in the last 72 hours. Thrombocytopenia not present at this time.  Patient showing no signs or symptoms of active bleeding.  Transfusion not indicated at this time.  Patient verbalizes understanding of all instructions. Will continue to assess and implement POC. Call light within reach and hourly rounding in place.

## 2024-12-17 NOTE — H&P
abnormality.    ABDOMEN AND PELVIS:  Liver and biliary system: 3.0 x 2.6 cm poorly defined low-density lesion in the inferior right hepatic lobe (image 88), previously 1.9 x 1.7 cm. More posteriorly within the right posterior hepatic lobe, there is a 1.9 x 1.8 cm ill-defined low-density   lesion, much more conspicuous and increased from the prior study (also seen on image 88). Gallbladder absent.  Pancreas: Normal.  Spleen: Normal.   Adrenal glands: Normal.   Genitourinary: Symmetric renal enhancement. No hydronephrosis. Small posterior mid right kidney low-density lesion, stable, compatible with cysts not requiring further follow-up. Bladder collapsed which limits evaluation. Prostate unremarkable.    Bowel: The small and large bowel are normal in caliber. No bowel wall thickening. Postoperative changes of prior gastrojejunostomy. There is some subtle mucosal fat attenuation involving the colon. Stable mildly prominent submucosal vascularity along the   upper rectum potentially representing internal hemorrhoid.  Abdominal wall: Small fat-containing umbilical hernia. Small fat-containing left inguinal hernia.   Peritoneum/retroperitoneum: Stable hazy density in the central mesentery.    Vasculature: Unchanged abdominal aortic aneurysm measuring up to 3.9 cm.   Lymph nodes: No lymphadenopathy is appreciated.     Osseous structures: Lower lumbar spondylosis.   Impression: CHEST:  Stable geographic areas of radiation fibrosis in both lungs.    Resolution of previously noted left lower lobe consolidation on the prior study from 9/24/2024.    New 6 mm perifissural nodule in the inferior right upper lobe, indeterminate, potentially intrafissural lymph node. Given the history of malignancy, continued follow-up is recommended per oncology protocol.    ABDOMEN AND PELVIS:  Increased size of hepatic low-density lesions compatible with increased metastases.    No other evidence of abdominopelvic metastatic

## 2024-12-18 VITALS
TEMPERATURE: 97.6 F | HEIGHT: 67 IN | BODY MASS INDEX: 30.1 KG/M2 | DIASTOLIC BLOOD PRESSURE: 81 MMHG | RESPIRATION RATE: 18 BRPM | OXYGEN SATURATION: 90 % | HEART RATE: 106 BPM | WEIGHT: 191.8 LBS | SYSTOLIC BLOOD PRESSURE: 151 MMHG

## 2024-12-18 LAB
ALBUMIN SERPL-MCNC: 3.5 G/DL (ref 3.4–5)
ALBUMIN/GLOB SERPL: 1.3 {RATIO} (ref 1.1–2.2)
ALP SERPL-CCNC: 98 U/L (ref 40–129)
ALT SERPL-CCNC: 42 U/L (ref 10–40)
ANION GAP SERPL CALCULATED.3IONS-SCNC: 11 MMOL/L (ref 3–16)
AST SERPL-CCNC: 37 U/L (ref 15–37)
BASOPHILS # BLD: 0 K/UL (ref 0–0.2)
BASOPHILS NFR BLD: 0.1 %
BILIRUB SERPL-MCNC: 0.5 MG/DL (ref 0–1)
BUN SERPL-MCNC: 18 MG/DL (ref 7–20)
CALCIUM SERPL-MCNC: 9.2 MG/DL (ref 8.3–10.6)
CHLORIDE SERPL-SCNC: 107 MMOL/L (ref 99–110)
CO2 SERPL-SCNC: 22 MMOL/L (ref 21–32)
CREAT SERPL-MCNC: 0.8 MG/DL (ref 0.8–1.3)
CRP SERPL-MCNC: 3.9 MG/L (ref 0–5.1)
DEPRECATED RDW RBC AUTO: 15.7 % (ref 12.4–15.4)
EOSINOPHIL # BLD: 0 K/UL (ref 0–0.6)
EOSINOPHIL NFR BLD: 0 %
FERRITIN SERPL IA-MCNC: 76 NG/ML (ref 30–400)
GFR SERPLBLD CREATININE-BSD FMLA CKD-EPI: >90 ML/MIN/{1.73_M2}
GLUCOSE SERPL-MCNC: 220 MG/DL (ref 70–99)
HCT VFR BLD AUTO: 38.8 % (ref 40.5–52.5)
HGB BLD-MCNC: 13.3 G/DL (ref 13.5–17.5)
LYMPHOCYTES # BLD: 0.6 K/UL (ref 1–5.1)
LYMPHOCYTES NFR BLD: 5.4 %
MAGNESIUM SERPL-MCNC: 2.03 MG/DL (ref 1.8–2.4)
MCH RBC QN AUTO: 32.8 PG (ref 26–34)
MCHC RBC AUTO-ENTMCNC: 34.3 G/DL (ref 31–36)
MCV RBC AUTO: 95.8 FL (ref 80–100)
MONOCYTES # BLD: 0.8 K/UL (ref 0–1.3)
MONOCYTES NFR BLD: 7.4 %
NEUTROPHILS # BLD: 9.1 K/UL (ref 1.7–7.7)
NEUTROPHILS NFR BLD: 87.1 %
PLATELET # BLD AUTO: 155 K/UL (ref 135–450)
PMV BLD AUTO: 7.9 FL (ref 5–10.5)
POTASSIUM SERPL-SCNC: 4.5 MMOL/L (ref 3.5–5.1)
PROT SERPL-MCNC: 6.1 G/DL (ref 6.4–8.2)
RBC # BLD AUTO: 4.05 M/UL (ref 4.2–5.9)
SODIUM SERPL-SCNC: 140 MMOL/L (ref 136–145)
WBC # BLD AUTO: 10.4 K/UL (ref 4–11)

## 2024-12-18 PROCEDURE — 2500000003 HC RX 250 WO HCPCS: Performed by: NURSE PRACTITIONER

## 2024-12-18 PROCEDURE — 85025 COMPLETE CBC W/AUTO DIFF WBC: CPT

## 2024-12-18 PROCEDURE — 83735 ASSAY OF MAGNESIUM: CPT

## 2024-12-18 PROCEDURE — G0378 HOSPITAL OBSERVATION PER HR: HCPCS

## 2024-12-18 PROCEDURE — 82728 ASSAY OF FERRITIN: CPT

## 2024-12-18 PROCEDURE — 80053 COMPREHEN METABOLIC PANEL: CPT

## 2024-12-18 PROCEDURE — 6370000000 HC RX 637 (ALT 250 FOR IP): Performed by: NURSE PRACTITIONER

## 2024-12-18 PROCEDURE — 36415 COLL VENOUS BLD VENIPUNCTURE: CPT

## 2024-12-18 PROCEDURE — 86140 C-REACTIVE PROTEIN: CPT

## 2024-12-18 RX ORDER — TAMSULOSIN HYDROCHLORIDE 0.4 MG/1
0.4 CAPSULE ORAL DAILY
Qty: 30 CAPSULE | Refills: 3 | Status: SHIPPED | OUTPATIENT
Start: 2024-12-19

## 2024-12-18 RX ORDER — SUCRALFATE 1 G/1
1 TABLET ORAL EVERY 6 HOURS PRN
Qty: 120 TABLET | Refills: 3 | Status: SHIPPED | OUTPATIENT
Start: 2024-12-18

## 2024-12-18 RX ORDER — PREDNISONE 5 MG/1
15 TABLET ORAL DAILY
Qty: 30 TABLET | Refills: 0 | Status: SHIPPED | OUTPATIENT
Start: 2024-12-19 | End: 2024-12-29

## 2024-12-18 RX ADMIN — AMLODIPINE BESYLATE 5 MG: 5 TABLET ORAL at 07:39

## 2024-12-18 RX ADMIN — TAMSULOSIN HYDROCHLORIDE 0.4 MG: 0.4 CAPSULE ORAL at 07:38

## 2024-12-18 RX ADMIN — SODIUM CHLORIDE, PRESERVATIVE FREE 10 ML: 5 INJECTION INTRAVENOUS at 07:39

## 2024-12-18 RX ADMIN — PREDNISONE 15 MG: 5 TABLET ORAL at 07:38

## 2024-12-18 RX ADMIN — PANTOPRAZOLE SODIUM 40 MG: 40 TABLET, DELAYED RELEASE ORAL at 06:26

## 2024-12-18 NOTE — CARE COORDINATION
Addendum at 11:08am: Discharge order noted. CM/SW not following. No HHC or DME orders entered in epic. No needs identified. Soraida YAÑEZ aware    7:35am: chart review completed. Spoke with pt and his wife/daughter yesterday and pt confirmed he will return home with no needs from SW.    Pt has 2 liters of o2 at home through [facility].     SW/CM continues to not follow. Please consult if needs arise.     Isabel DE JESUS, MAICOL-S   for [facility] ([facility])  [place] Mobile: [phone]

## 2024-12-18 NOTE — PROGRESS NOTES
Reviewed discharge instructions with patient and  family members.  Reviewed discharge medications including dosing, schedule, indication, and adverse reactions.  Reviewed which medications were already taken today and next dosage due for each medication.      Reviewed signs and symptoms that prompt a call to the physician and appropriate phone numbers. Reviewed follow up appointments that have been made in [facility] and Outpatient Oncology.  Low microbial diet, activity restrictions, and increased risk of infection were reviewed.     Patient verbalized understanding of all instructions and questions were answered to his. satisfaction.  Signed discharge instructions were given to the patient and a copy placed in the paper-lite chart.  Patient discharged to home per wheelchair with family members.      Soraida Piña RN

## 2024-12-18 NOTE — CARE COORDINATION
Soraida YAÑEZ obtained healthcare POA papers and is placing in hard chart with pt stickers.     Isabel Benson MSW, JULISA   for [facility] ([facility])  [employer] Mobile: [phone]

## 2024-12-19 ENCOUNTER — OFFICE VISIT (OUTPATIENT)
Dept: PULMONOLOGY | Age: 74
End: 2024-12-19
Payer: MEDICARE

## 2024-12-19 VITALS
BODY MASS INDEX: 30.76 KG/M2 | SYSTOLIC BLOOD PRESSURE: 122 MMHG | OXYGEN SATURATION: 95 % | HEART RATE: 105 BPM | HEIGHT: 67 IN | WEIGHT: 196 LBS | DIASTOLIC BLOOD PRESSURE: 78 MMHG

## 2024-12-19 DIAGNOSIS — J96.11 CHRONIC RESPIRATORY FAILURE WITH HYPOXIA: ICD-10-CM

## 2024-12-19 DIAGNOSIS — J70.0 RADIATION PNEUMONITIS (HCC): Primary | ICD-10-CM

## 2024-12-19 PROCEDURE — 1036F TOBACCO NON-USER: CPT | Performed by: INTERNAL MEDICINE

## 2024-12-19 PROCEDURE — 1159F MED LIST DOCD IN RCRD: CPT | Performed by: INTERNAL MEDICINE

## 2024-12-19 PROCEDURE — G8484 FLU IMMUNIZE NO ADMIN: HCPCS | Performed by: INTERNAL MEDICINE

## 2024-12-19 PROCEDURE — G8417 CALC BMI ABV UP PARAM F/U: HCPCS | Performed by: INTERNAL MEDICINE

## 2024-12-19 PROCEDURE — G2211 COMPLEX E/M VISIT ADD ON: HCPCS | Performed by: INTERNAL MEDICINE

## 2024-12-19 PROCEDURE — 3017F COLORECTAL CA SCREEN DOC REV: CPT | Performed by: INTERNAL MEDICINE

## 2024-12-19 PROCEDURE — 99214 OFFICE O/P EST MOD 30 MIN: CPT | Performed by: INTERNAL MEDICINE

## 2024-12-19 PROCEDURE — 1160F RVW MEDS BY RX/DR IN RCRD: CPT | Performed by: INTERNAL MEDICINE

## 2024-12-19 PROCEDURE — 1123F ACP DISCUSS/DSCN MKR DOCD: CPT | Performed by: INTERNAL MEDICINE

## 2024-12-19 PROCEDURE — G8427 DOCREV CUR MEDS BY ELIG CLIN: HCPCS | Performed by: INTERNAL MEDICINE

## 2024-12-19 NOTE — PROGRESS NOTES
PULMONARY OFFICE FOLLOW UP NOTE    REASON FOR VISIT:   Chief Complaint   Patient presents with    Pneumonitis     Stated breathing is a little better. Using 2 L/min oxygen which has helped a lot.       DATE OF VISIT: 12/19/2024    HISTORY OF PRESENT ILLNESS: 74 y.o. year old male former smoker is here for follow-up of hypoxic respiratory failure.     Patient was noted to have left upper lobe mass along with right hilar lymphadenopathy in March 2024 for which he underwent bronchoscopy on 3/7/2024 and results were positive for small cell lung cancer at least stage IIIc.  He was initiated on on carboplatin/etoposide and Tecentriq which he finished on 5/23/2024.  Radiation was started on 5/24/2024 with maintenance Tecentriq.  Noted to have liver mets and he was switched to lurbinectedin on 9/17/2024     Patient developed radiation pneumonitis in July and has been on steroids since August 2024.  He was started on prednisone 20 mg daily which he took for 2 weeks and then taper to 10 mg daily and now he is on 5 mg daily.     Recently he was hospitalized for worsening shortness of breath and hypoxia.  CT chest from 9/24/2024 continues to show bilateral perihilar fibrosis consistent with radiation pneumonitis along with new left lower lobe infiltrate.  He was treated for presumed pneumonia and discharged on oxygen.  Currently using oxygen at 2-4 L/min     Patient was seen by me on 10/18/2024 for significant dyspnea on exertion as well as cough with exercise desaturation.  For severe radiation and immunotherapy induced pneumonitis, patient was initiated on high-dose steroids with prednisone 40 mg daily.  He has tapered prednisone down to 15 mg daily.  Also was initiated on oxygen.  He has noticed improvement in dyspnea on exertion with steroids.  No cough or wheezing or chest pain or hemoptysis.    Recent CT chest from 12/2/2024 showed stable changes in the lung.  For progressive lung cancer with liver lesions noted on

## 2024-12-24 ENCOUNTER — HOSPITAL ENCOUNTER (OUTPATIENT)
Age: 74
Setting detail: OBSERVATION
Discharge: HOME | End: 2024-12-25
Attending: INTERNAL MEDICINE | Admitting: INTERNAL MEDICINE
Payer: MEDICARE

## 2024-12-24 PROBLEM — C80.1 SMALL CELL CARCINOMA (HCC): Status: ACTIVE | Noted: 2024-12-24

## 2024-12-24 PROBLEM — C34.90 SMALL CELL LUNG CANCER IN ADULT (HCC): Status: ACTIVE | Noted: 2024-12-24

## 2024-12-24 LAB
BILIRUB UR QL STRIP.AUTO: NEGATIVE
CLARITY UR: CLEAR
COLOR UR: YELLOW
EKG ATRIAL RATE: 106 BPM
EKG DIAGNOSIS: NORMAL
EKG P AXIS: 45 DEGREES
EKG P-R INTERVAL: 172 MS
EKG Q-T INTERVAL: 350 MS
EKG QRS DURATION: 80 MS
EKG QTC CALCULATION (BAZETT): 464 MS
EKG R AXIS: 30 DEGREES
EKG T AXIS: 77 DEGREES
EKG VENTRICULAR RATE: 106 BPM
GLUCOSE UR STRIP.AUTO-MCNC: 500 MG/DL
HGB UR QL STRIP.AUTO: NEGATIVE
KETONES UR STRIP.AUTO-MCNC: ABNORMAL MG/DL
LEUKOCYTE ESTERASE UR QL STRIP.AUTO: NEGATIVE
NITRITE UR QL STRIP.AUTO: NEGATIVE
PH UR STRIP.AUTO: 6 [PH] (ref 5–8)
PROT UR STRIP.AUTO-MCNC: NEGATIVE MG/DL
SP GR UR STRIP.AUTO: 1.01 (ref 1–1.03)
UA DIPSTICK W REFLEX MICRO PNL UR: ABNORMAL
URN SPEC COLLECT METH UR: ABNORMAL
UROBILINOGEN UR STRIP-ACNC: 0.2 E.U./DL

## 2024-12-24 PROCEDURE — 96361 HYDRATE IV INFUSION ADD-ON: CPT

## 2024-12-24 PROCEDURE — 94150 VITAL CAPACITY TEST: CPT

## 2024-12-24 PROCEDURE — 81003 URINALYSIS AUTO W/O SCOPE: CPT

## 2024-12-24 PROCEDURE — 93010 ELECTROCARDIOGRAM REPORT: CPT | Performed by: INTERNAL MEDICINE

## 2024-12-24 PROCEDURE — 2580000003 HC RX 258

## 2024-12-24 PROCEDURE — G0378 HOSPITAL OBSERVATION PER HR: HCPCS

## 2024-12-24 PROCEDURE — 94761 N-INVAS EAR/PLS OXIMETRY MLT: CPT

## 2024-12-24 PROCEDURE — 96360 HYDRATION IV INFUSION INIT: CPT

## 2024-12-24 PROCEDURE — G0379 DIRECT REFER HOSPITAL OBSERV: HCPCS

## 2024-12-24 PROCEDURE — 93005 ELECTROCARDIOGRAM TRACING: CPT

## 2024-12-24 RX ORDER — SODIUM CHLORIDE 9 MG/ML
500 INJECTION, SOLUTION INTRAVENOUS CONTINUOUS PRN
Status: DISCONTINUED | OUTPATIENT
Start: 2024-12-24 | End: 2024-12-25

## 2024-12-24 RX ORDER — ENOXAPARIN SODIUM 100 MG/ML
40 INJECTION SUBCUTANEOUS DAILY
Status: DISCONTINUED | OUTPATIENT
Start: 2024-12-24 | End: 2024-12-25

## 2024-12-24 RX ORDER — SODIUM CHLORIDE 9 MG/ML
INJECTION, SOLUTION INTRAVENOUS PRN
Status: DISCONTINUED | OUTPATIENT
Start: 2024-12-24 | End: 2024-12-25

## 2024-12-24 RX ORDER — SODIUM CHLORIDE 0.9 % (FLUSH) 0.9 %
5-40 SYRINGE (ML) INJECTION EVERY 12 HOURS SCHEDULED
Status: DISCONTINUED | OUTPATIENT
Start: 2024-12-24 | End: 2024-12-25

## 2024-12-24 RX ORDER — ACETAMINOPHEN 325 MG/1
650 TABLET ORAL EVERY 6 HOURS PRN
Status: DISCONTINUED | OUTPATIENT
Start: 2024-12-24 | End: 2024-12-25

## 2024-12-24 RX ORDER — POTASSIUM CHLORIDE 29.8 MG/ML
20 INJECTION INTRAVENOUS PRN
Status: DISCONTINUED | OUTPATIENT
Start: 2024-12-24 | End: 2024-12-25

## 2024-12-24 RX ORDER — POLYETHYLENE GLYCOL 3350 17 G/17G
17 POWDER, FOR SOLUTION ORAL DAILY
Status: ON HOLD | COMMUNITY
End: 2024-12-25 | Stop reason: HOSPADM

## 2024-12-24 RX ORDER — MAGNESIUM SULFATE IN WATER 40 MG/ML
4000 INJECTION, SOLUTION INTRAVENOUS PRN
Status: DISCONTINUED | OUTPATIENT
Start: 2024-12-24 | End: 2024-12-25

## 2024-12-24 RX ORDER — SODIUM CHLORIDE 0.9 % (FLUSH) 0.9 %
5-40 SYRINGE (ML) INJECTION PRN
Status: DISCONTINUED | OUTPATIENT
Start: 2024-12-24 | End: 2024-12-25

## 2024-12-24 RX ORDER — SODIUM CHLORIDE 9 MG/ML
INJECTION, SOLUTION INTRAVENOUS CONTINUOUS
Status: DISCONTINUED | OUTPATIENT
Start: 2024-12-24 | End: 2024-12-25

## 2024-12-24 RX ADMIN — SODIUM CHLORIDE: 9 INJECTION, SOLUTION INTRAVENOUS at 13:25

## 2024-12-24 NOTE — PLAN OF CARE
Problem: Safety - Adult  Goal: Free from fall injury  Outcome: Progressing  Flowsheets (Taken 12/24/2024 1550)  Free From Fall Injury:   Instruct family/caregiver on patient safety   Based on caregiver fall risk screen, instruct family/caregiver to ask for assistance with transferring infant if caregiver noted to have fall risk factors  Note: Orthostatic vital signs obtained at start of shift - see flowsheet for details.  Pt does not meet criteria for orthostasis.  Pt is a Med fall risk. See Coley Fall Score and ABCDS Injury Risk assessments.     - Screening for Orthostasis AND not a [place] Risk per COLEY/ABCDS: Pt bed is in low position, side rails up, call light and belongings are in reach.  Fall risk light is on outside pts room.  Pt encouraged to call for assistance as needed. Will continue with hourly rounds for PO intake, pain needs, toileting and repositioning as needed.      Problem: ABCDS Injury Assessment  Goal: Absence of physical injury  Outcome: Progressing  Flowsheets (Taken 12/24/2024 1550)  Absence of Physical Injury: Implement safety measures based on patient assessment  Note: Bed in lowest position, call light within reach, bed and chair wheels locked, non-skid socks on, bed side table within reach, 2x side rails up.      Problem: Hematologic - Adult  Goal: Maintains hematologic stability  Outcome: Progressing  Flowsheets (Taken 12/24/2024 1550)  Maintains hematologic stability:   Assess for signs and symptoms of bleeding or hemorrhage   Administer blood products/factors as ordered   Monitor labs for bleeding or clotting disorders  Note: Monitoring patient's labs. No replacements needed at this time. Care continues.

## 2024-12-24 NOTE — PROGRESS NOTES
4 Eyes Admission Assessment     I agree as the admission nurse that 2 RN's have performed a thorough Head to Toe Skin Assessment on the patient. ALL assessment sites listed below have been assessed on admission.       Areas assessed by both nurses:  [x]   Head, Face, and Ears   [x]   Shoulders, Back, and Chest  [x]   Arms, Elbows, and Hands   [x]   Coccyx, Sacrum, and Ischium  [x]   Legs, Feet, and Heels        Does the Patient have Skin Breakdown?  No         Ori Prevention initiated:  Yes   Wound Care Orders initiated:  No      [facility] nurse consulted for Pressure Injury (Stage 3,4, Unstageable, DTI, NWPT, and Complex wounds) or Ori score 18 or lower:  No      Nurse 1 eSignature: Electronically signed by Loretta Curry RN on 12/24/24 at 2:38 PM EST    **SHARE this note so that the co-signing nurse is able to place an eSignature**    Nurse 2 eSignature: {Esignature:[id number]}

## 2024-12-24 NOTE — PROGRESS NOTES
Patient admitted to [facility] via  direct admit from [facility] for diagnosis of Small Cell Carcinoma of Lung.  Here to receive chemotherapy regimen Imdeltra.  Original chemotherapy orders reviewed and acknowledged. Appropriateness of chemotherapy treatment regimen Imdeltra for diagnosis of Small Cell Carcinoma of Lung  was verified.  Patient educated on chemotherapy regimen.  Consent for chemotherapy obtained.      Estimated body surface area is 2.05 meters squared as calculated from the following:    Height as of this encounter: 1.702 m (5' 7.01\").    Weight as of this encounter: 88.9 kg (196 lb). verified.  Appropriate dosing calculations of chemotherapy based on above height, weight, and BSA verified.      Patient and family oriented to patient room including call light and bed controls.  Admission assessment completed - see admission flowsheet documentation.  Patient is a low fall risk.  Safety measures instituted per policy.      Patient and family oriented to unit policies and procedures including: pain management practices, unit safety precautions, family rapid response, q4h vital signs and assessments, daily 4am lab draws, weekly chest x-rays, daily chlorhexidine bathing, standing transfusion orders, and routine central line care.  Also discussed use of call light and how to get in touch with nursing staff.  Stressed the importance of calling out immediately for any changes in condition including but not limited to: pain, chills, fever, nausea, vomiting, diarrhea, chest pain, sob/wells, assistance with toileting, bleeding, or any other symptoms that are out of the ordinary for the patient.  Patient verbalizes understanding of all instructions and will call for assistance as needed.    VSS on arrival. Received report from Crystal YAÑEZ from [facility] via phone.

## 2024-12-25 VITALS
OXYGEN SATURATION: 95 % | TEMPERATURE: 97.6 F | BODY MASS INDEX: 30.26 KG/M2 | HEIGHT: 67 IN | HEART RATE: 113 BPM | DIASTOLIC BLOOD PRESSURE: 75 MMHG | WEIGHT: 192.8 LBS | SYSTOLIC BLOOD PRESSURE: 148 MMHG | RESPIRATION RATE: 22 BRPM

## 2024-12-25 LAB
ALBUMIN SERPL-MCNC: 3.4 G/DL (ref 3.4–5)
ALP SERPL-CCNC: 89 U/L (ref 40–129)
ALT SERPL-CCNC: 35 U/L (ref 10–40)
ANION GAP SERPL CALCULATED.3IONS-SCNC: 9 MMOL/L (ref 3–16)
AST SERPL-CCNC: 24 U/L (ref 15–37)
BASOPHILS # BLD: 0 K/UL (ref 0–0.2)
BASOPHILS NFR BLD: 0.2 %
BILIRUB DIRECT SERPL-MCNC: 0.2 MG/DL (ref 0–0.3)
BILIRUB INDIRECT SERPL-MCNC: 0.2 MG/DL (ref 0–1)
BILIRUB SERPL-MCNC: 0.4 MG/DL (ref 0–1)
BUN SERPL-MCNC: 14 MG/DL (ref 7–20)
CALCIUM SERPL-MCNC: 9 MG/DL (ref 8.3–10.6)
CHLORIDE SERPL-SCNC: 107 MMOL/L (ref 99–110)
CO2 SERPL-SCNC: 22 MMOL/L (ref 21–32)
CREAT SERPL-MCNC: 0.9 MG/DL (ref 0.8–1.3)
CRP SERPL-MCNC: 5.4 MG/L (ref 0–5.1)
DEPRECATED RDW RBC AUTO: 15 % (ref 12.4–15.4)
EOSINOPHIL # BLD: 0 K/UL (ref 0–0.6)
EOSINOPHIL NFR BLD: 0.2 %
FERRITIN SERPL IA-MCNC: 80.2 NG/ML (ref 30–400)
FIBRINOGEN PPP-MCNC: 381 MG/DL (ref 227–534)
GFR SERPLBLD CREATININE-BSD FMLA CKD-EPI: 90 ML/MIN/{1.73_M2}
GLUCOSE SERPL-MCNC: 202 MG/DL (ref 70–99)
HCT VFR BLD AUTO: 38 % (ref 40.5–52.5)
HGB BLD-MCNC: 13.3 G/DL (ref 13.5–17.5)
LDH SERPL L TO P-CCNC: 291 U/L (ref 100–190)
LYMPHOCYTES # BLD: 0.3 K/UL (ref 1–5.1)
LYMPHOCYTES NFR BLD: 3.5 %
MAGNESIUM SERPL-MCNC: 1.83 MG/DL (ref 1.8–2.4)
MCH RBC QN AUTO: 33.5 PG (ref 26–34)
MCHC RBC AUTO-ENTMCNC: 34.9 G/DL (ref 31–36)
MCV RBC AUTO: 95.9 FL (ref 80–100)
MONOCYTES # BLD: 0.6 K/UL (ref 0–1.3)
MONOCYTES NFR BLD: 6.9 %
NEUTROPHILS # BLD: 8.2 K/UL (ref 1.7–7.7)
NEUTROPHILS NFR BLD: 89.2 %
PHOSPHATE SERPL-MCNC: 2.4 MG/DL (ref 2.5–4.9)
PLATELET # BLD AUTO: 173 K/UL (ref 135–450)
PMV BLD AUTO: 8 FL (ref 5–10.5)
POTASSIUM SERPL-SCNC: 4.3 MMOL/L (ref 3.5–5.1)
PROT SERPL-MCNC: 5.9 G/DL (ref 6.4–8.2)
RBC # BLD AUTO: 3.96 M/UL (ref 4.2–5.9)
SODIUM SERPL-SCNC: 138 MMOL/L (ref 136–145)
URATE SERPL-MCNC: 2.7 MG/DL (ref 3.5–7.2)
WBC # BLD AUTO: 9.2 K/UL (ref 4–11)

## 2024-12-25 PROCEDURE — 85025 COMPLETE CBC W/AUTO DIFF WBC: CPT

## 2024-12-25 PROCEDURE — 85384 FIBRINOGEN ACTIVITY: CPT

## 2024-12-25 PROCEDURE — 82728 ASSAY OF FERRITIN: CPT

## 2024-12-25 PROCEDURE — 2580000003 HC RX 258

## 2024-12-25 PROCEDURE — 6360000002 HC RX W HCPCS

## 2024-12-25 PROCEDURE — 83735 ASSAY OF MAGNESIUM: CPT

## 2024-12-25 PROCEDURE — 2500000003 HC RX 250 WO HCPCS

## 2024-12-25 PROCEDURE — 96372 THER/PROPH/DIAG INJ SC/IM: CPT

## 2024-12-25 PROCEDURE — 80076 HEPATIC FUNCTION PANEL: CPT

## 2024-12-25 PROCEDURE — 96361 HYDRATE IV INFUSION ADD-ON: CPT

## 2024-12-25 PROCEDURE — 84100 ASSAY OF PHOSPHORUS: CPT

## 2024-12-25 PROCEDURE — 83615 LACTATE (LD) (LDH) ENZYME: CPT

## 2024-12-25 PROCEDURE — G0378 HOSPITAL OBSERVATION PER HR: HCPCS

## 2024-12-25 PROCEDURE — 84550 ASSAY OF BLOOD/URIC ACID: CPT

## 2024-12-25 PROCEDURE — 36415 COLL VENOUS BLD VENIPUNCTURE: CPT

## 2024-12-25 PROCEDURE — 86140 C-REACTIVE PROTEIN: CPT

## 2024-12-25 PROCEDURE — 80048 BASIC METABOLIC PNL TOTAL CA: CPT

## 2024-12-25 RX ADMIN — Medication 10 ML: at 09:23

## 2024-12-25 RX ADMIN — ENOXAPARIN SODIUM 40 MG: 100 INJECTION SUBCUTANEOUS at 09:23

## 2024-12-25 RX ADMIN — SODIUM CHLORIDE: 9 INJECTION, SOLUTION INTRAVENOUS at 02:07

## 2024-12-25 NOTE — PLAN OF CARE
Problem: Safety - Adult  Goal: Free from fall injury  12/25/2024 1203 by Leonid Knott, RN  Outcome: Progressing     Problem: ABCDS Injury Assessment  Goal: Absence of physical injury  12/25/2024 1203 by Leonid Knott, RN  Outcome: Progressing     Problem: Hematologic - Adult  Goal: Maintains hematologic stability  12/25/2024 1203 by Leonid Knott, RN  Outcome: Progressing

## 2024-12-25 NOTE — PLAN OF CARE
Problem: Safety - Adult  Goal: Free from fall injury  12/24/2024 2255 by Carina Miranda RN  Outcome: Progressing  Orthostatic vital signs obtained at start of shift - see flowsheet for details.  Pt does not meet criteria for orthostasis.  Pt is a Med fall risk. See Coley Fall Score and ABCDS Injury Risk assessments.   - Screening for Orthostasis AND not a [place] Risk per COLEY/ABCDS: Pt bed is in low position, side rails up, call light and belongings are in reach.  Fall risk light is on outside pts room.  Pt encouraged to call for assistance as needed. Will continue with hourly rounds for PO intake, pain needs, toileting and repositioning as needed.      Problem: ABCDS Injury Assessment  Goal: Absence of physical injury  12/24/2024 2255 by Carina Miranda RN  Outcome: Progressing     Problem: Hematologic - Adult  Goal: Maintains hematologic stability  12/24/2024 2255 by Carina Miranda RN  Outcome: Progressing  Patient's hemoglobin this AM:   Recent Labs     12/25/24  0357   HGB 13.3*     Patient's platelet count this AM:   Recent Labs     12/25/24  0357       Thrombocytopenia not present at this time.  Patient showing no signs or symptoms of active bleeding.  Transfusion not indicated at this time.  Patient verbalizes understanding of all instructions. Will continue to assess and implement POC. Call light within reach and hourly rounding in place.

## 2024-12-25 NOTE — DISCHARGE SUMMARY
DISCHARGE SUMMARY NOTE    Patient ID: Drew Carrasco 74 y.o. 1950     Admission Date: 12/24/2024     Discharge Date:  12/25/24    Admission Diagnoses: Small cell carcinoma (HCC) [C80.1]  Small cell lung cancer in adult (HCC) [C34.90]    Discharge Diagnoses:   Patient Active Problem List   Diagnosis    Acute on chronic respiratory failure with hypoxia    Radiation pneumonitis (HCC)    Small cell carcinoma of lung (HCC)    Urinary tract infection without hematuria    Neutropenic fever (HCC)    Abnormal CT of the chest    Lactic acid acidosis    LFT elevation    On prednisone therapy    On supplemental oxygen by nasal cannula    Acute respiratory failure with hypoxia    Small cell carcinoma of lung, right (HCC)    Small cell carcinoma (HCC)    Small cell lung cancer in adult (HCC)          Allergies: Seasonal    Discharge Medications:    sodium chloride flush  5-40 mL IntraVENous 2 times per day    Saline Mouthwash  15 mL Swish & Spit 4x Daily AC & HS    enoxaparin  40 mg SubCUTAneous Daily       Vital Signs: BP (!) 148/75   Pulse (!) 113   Temp 97.6 °F (36.4 °C) (Oral)   Resp 22   Ht 1.702 m (5' 7.01\") Comment: actual, measured  Wt 87.5 kg (192 lb 12.8 oz)   SpO2 95%   BMI 30.19 kg/m²   CONSTITUTIONAL:  Awake, alert & oriented x3  HEENT: PERRL, Neck: soft, supple, no cervical, supraclavicular or axillary adenopathy  RESPIRATORY:  No increased work of breathing, breath sounds decreased.  CARDIOVASCULAR:  Cardiac S1/S2, RRR  GASTROINTESTINAL:  abdomen soft +BS x4, no hepatosplenomegaly  SKIN:  negative for rash and skin lesion(s)  MUSCULOSKELETAL:  negative for pain and muscle weakness  EXTREMITIES: Negative for Lower extremity edema    Brief Summary of Patient's Course:    He has a history of extensive stage small cell lung cancer and recently developed disease progression while on Lurbinectedin.   S/p C1D8  Imdeltra 12/14/24 and was admitted for 24 hour observation following. He has had no issues

## 2024-12-25 NOTE — PROGRESS NOTES
Reviewed discharge instructions with patient and  spouse.  Reviewed discharge medications including dosing, schedule, indication, and adverse reactions.  Reviewed which medications were already taken today and next dosage due for each medication.      Reviewed signs and symptoms that prompt a call to the physician and appropriate phone numbers.  Patient verbalized understanding of all instructions and questions were answered to his. satisfaction. Patient discharged to home per self with spouse.      JUDIE TINOCO RN

## 2024-12-25 NOTE — CARE COORDINATION
2:09 PM    Pt was being dc as SW provided moon, pt declined martinez as he was leaving.     Electronically signed by LIZA Brooks, JANNIE, SABAS-STEVAN on 12/25/2024 at 2:09 PM  [phone]

## 2025-03-06 ENCOUNTER — PATIENT MESSAGE (OUTPATIENT)
Dept: PULMONOLOGY | Age: 75
End: 2025-03-06

## 2025-03-06 NOTE — TELEPHONE ENCOUNTER
R/S pt for 4/4/2025 per daughter so his scan will be resulted for Dr Bautista from [facility].

## 2025-03-20 ENCOUNTER — HOSPITAL ENCOUNTER (OUTPATIENT)
Age: 75
Discharge: HOME | End: 2025-03-20
Payer: MEDICARE

## 2025-03-20 DIAGNOSIS — C34.12 SQUAMOUS CELL CARCINOMA OF BRONCHUS IN LEFT UPPER LOBE: ICD-10-CM

## 2025-03-20 LAB
EGFR, POC: 79 ML/MIN/1.73M2
POC CREATININE: 1 MG/DL (ref 0.8–1.3)

## 2025-03-20 PROCEDURE — 6360000004 HC RX CONTRAST MEDICATION

## 2025-03-20 PROCEDURE — 74177 CT ABD & PELVIS W/CONTRAST: CPT

## 2025-03-20 PROCEDURE — 82565 ASSAY OF CREATININE: CPT

## 2025-03-20 RX ORDER — IOPAMIDOL 755 MG/ML
75 INJECTION, SOLUTION INTRAVASCULAR
Status: COMPLETED | OUTPATIENT
Start: 2025-03-20 | End: 2025-03-20

## 2025-03-20 RX ADMIN — IOPAMIDOL 75 ML: 755 INJECTION, SOLUTION INTRAVENOUS at 06:36

## 2025-04-04 ENCOUNTER — OFFICE VISIT (OUTPATIENT)
Dept: PULMONOLOGY | Age: 75
End: 2025-04-04
Payer: MEDICARE

## 2025-04-04 VITALS
HEART RATE: 130 BPM | BODY MASS INDEX: 29 KG/M2 | HEIGHT: 67 IN | DIASTOLIC BLOOD PRESSURE: 68 MMHG | SYSTOLIC BLOOD PRESSURE: 100 MMHG | WEIGHT: 184.8 LBS | OXYGEN SATURATION: 96 %

## 2025-04-04 DIAGNOSIS — J70.0 RADIATION PNEUMONITIS: Primary | ICD-10-CM

## 2025-04-04 DIAGNOSIS — J96.11 CHRONIC RESPIRATORY FAILURE WITH HYPOXIA: ICD-10-CM

## 2025-04-04 PROCEDURE — 99214 OFFICE O/P EST MOD 30 MIN: CPT | Performed by: INTERNAL MEDICINE

## 2025-04-04 PROCEDURE — 3017F COLORECTAL CA SCREEN DOC REV: CPT | Performed by: INTERNAL MEDICINE

## 2025-04-04 PROCEDURE — 1123F ACP DISCUSS/DSCN MKR DOCD: CPT | Performed by: INTERNAL MEDICINE

## 2025-04-04 PROCEDURE — 1159F MED LIST DOCD IN RCRD: CPT | Performed by: INTERNAL MEDICINE

## 2025-04-04 PROCEDURE — 1036F TOBACCO NON-USER: CPT | Performed by: INTERNAL MEDICINE

## 2025-04-04 PROCEDURE — G8417 CALC BMI ABV UP PARAM F/U: HCPCS | Performed by: INTERNAL MEDICINE

## 2025-04-04 PROCEDURE — G8427 DOCREV CUR MEDS BY ELIG CLIN: HCPCS | Performed by: INTERNAL MEDICINE

## 2025-04-04 RX ORDER — DEXAMETHASONE 4 MG/1
4 TABLET ORAL
COMMUNITY

## 2025-04-04 NOTE — PROGRESS NOTES
PULMONARY OFFICE FOLLOW UP NOTE    REASON FOR VISIT:   Chief Complaint   Patient presents with    Pneumonitis    Lung Cancer     On 2 L oxygen and is using more often after his last round of chemo on Tuesday. Stated breathing is much better now, he feels.       DATE OF VISIT: 4/4/2025    HISTORY OF PRESENT ILLNESS: 75 y.o. year old male former smoker is here for follow-up of hypoxic respiratory failure.     Patient was noted to have left upper lobe mass along with right hilar lymphadenopathy in March 2024 for which he underwent bronchoscopy on 3/7/2024 and results were positive for small cell lung cancer at least stage IIIc.  He was initiated on on carboplatin/etoposide and Tecentriq which he finished on 5/23/2024.  Radiation was started on 5/24/2024 with maintenance immunotherapy  CT chest from 12/2/2024 showed stable changes in the lung.  For progressive lung cancer with liver lesions noted on scan, patient was initiated on Imdeltra on 12/17/2024   Latest CT chest from 3/20/2025 continues to show stable radiation pneumonitis with fibrosis however worsening left hilar mass, new metastatic lesions in the liver.  He has seen oncology on 4/1/2025 and he was given option for comfort care or single agent chemotherapy.  Patient has opted for single agent chemotherapy, Taxol and received first treatment on Tuesday.   I personally reviewed and interpreted the images     Patient developed radiation pneumonitis in July and was placed on steroids.   He was hospitalized for worsening shortness of breath and hypoxia.  CT chest from 9/24/2024 continues to show bilateral perihilar fibrosis consistent with radiation pneumonitis along with new left lower lobe infiltrate.  He was treated for presumed pneumonia and discharged on oxygen.  Currently using oxygen at 2-4 L/min     Patient was seen by me on 10/18/2024 for significant dyspnea on exertion as well as cough with exercise desaturation.  For severe radiation and  no

## (undated) DEVICE — SYRINGE MED 10ML POLYPR LUERSLIP TIP FLAT TOP W/O SFTY DISP

## (undated) DEVICE — VALVE SHEATH BRONCHOSCOPE

## (undated) DEVICE — ENDOSCOPIC KIT 6X3/16 FT COLON W/ 1.1 OZ 2 GWN W/O BRSH

## (undated) DEVICE — Device: Brand: BALLOON

## (undated) DEVICE — CONMED CHANNEL MASTER PULMONARY AND PEDIATRIC CLEANING BRUSH, 160 CM X 2.0 MM: Brand: CONMED

## (undated) DEVICE — SINGLE USE SUCTION VALVE MAJ-209: Brand: SINGLE USE SUCTION VALVE (STERILE)

## (undated) DEVICE — FORCEPS BIOPSY SMOOTH CUP

## (undated) DEVICE — TUBING FLUIDICS BRONCHOSCOPE

## (undated) DEVICE — Device: Brand: MEDEX

## (undated) DEVICE — SYSTEM BX L114MM DIA1.9MM COR PROPRIETARY BLDE DSGN FOR

## (undated) DEVICE — GOWN AURORA NONREINF LG: Brand: MEDLINE INDUSTRIES, INC.

## (undated) DEVICE — KIT INTRODUCER BRONCHOSCOPE PATIENT

## (undated) DEVICE — PATCHES NAVIGATION PATIENT

## (undated) DEVICE — SYRINGE MED 50ML LUERLOCK TIP

## (undated) DEVICE — BRONCHOSCOPES MONARCH

## (undated) DEVICE — MACROBORE EXTN SET W/ 1 SMRTSITE NEEDLE-FREE VLV PRT

## (undated) DEVICE — NEEDLE BRONCHSCP 21GA HNDL SHTH NDL STYL PERIVIEW FLX

## (undated) DEVICE — BRUSH CYTO L115CM DIA1.9MM 3 NDL PULM USE SUPERDIMENSION

## (undated) DEVICE — NEEDLE ASPIR 19GA HISTOLOGY FLX VIZISHOT 2

## (undated) DEVICE — SINGLE USE BIOPSY VALVE MAJ-210: Brand: SINGLE USE BIOPSY VALVE (STERILE)